# Patient Record
Sex: FEMALE | Race: WHITE | NOT HISPANIC OR LATINO | Employment: FULL TIME | URBAN - METROPOLITAN AREA
[De-identification: names, ages, dates, MRNs, and addresses within clinical notes are randomized per-mention and may not be internally consistent; named-entity substitution may affect disease eponyms.]

---

## 2017-04-27 ENCOUNTER — OFFICE VISIT (OUTPATIENT)
Dept: MEDICAL GROUP | Facility: MEDICAL CENTER | Age: 37
End: 2017-04-27
Payer: COMMERCIAL

## 2017-04-27 VITALS
TEMPERATURE: 99.6 F | WEIGHT: 129.2 LBS | HEART RATE: 80 BPM | RESPIRATION RATE: 16 BRPM | SYSTOLIC BLOOD PRESSURE: 108 MMHG | DIASTOLIC BLOOD PRESSURE: 60 MMHG | BODY MASS INDEX: 20.76 KG/M2 | HEIGHT: 66 IN | OXYGEN SATURATION: 98 %

## 2017-04-27 DIAGNOSIS — Z00.00 WELLNESS EXAMINATION: ICD-10-CM

## 2017-04-27 DIAGNOSIS — Z11.59 NEED FOR HEPATITIS B SCREENING TEST: ICD-10-CM

## 2017-04-27 DIAGNOSIS — Z13.6 SCREENING FOR CARDIOVASCULAR CONDITION: ICD-10-CM

## 2017-04-27 PROCEDURE — 99385 PREV VISIT NEW AGE 18-39: CPT | Performed by: PHYSICIAN ASSISTANT

## 2017-04-27 ASSESSMENT — PATIENT HEALTH QUESTIONNAIRE - PHQ9: CLINICAL INTERPRETATION OF PHQ2 SCORE: 0

## 2017-04-27 NOTE — PROGRESS NOTES
Chief Complaint:     Vanessa Mora is a 37 y.o. female who presents for annual exam with labs. Needs hep b titer before starting [employer] school    Pt has GYN provider: yes  Up to date on pap/pelvic. No prior mammogram.     She  has no past medical history on file.no heart or lung disease. No diabetes or immune disorder  She  has no past surgical history on file.no prior surgeries  No current outpatient prescriptions on file.     No current facility-administered medications for this visit.     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  Eyes: negative for visual blurring, double vision, eye pain, floaters and discharge from eyes  Ears/Nose/Throat: negative for tinnitus, vertigo, oral or dental problem, hoarseness, frequent URI's, sinus trouble, persistent sore throat  Respiratory: negative for persistent cough, hemoptysis, dyspnea, wheezing  Cardiovascular: negative for palpitations, tachycardia, irregular heart beat, chest pain or pressure or peripheral edema.  Breast: Denies breast tenderness, mass,  changes in size or contour, or abnormal cyclic discomfort.  Gastrointestinal: negative for dysphagia or odynophagia, nausea, heartburn or reflux, abdominal pain, hemorrhoids, constipation or diarrhea, black stool or bloody stool  Genitourinary: negative for nocturia, dysuria, frequency, incontinence, abnormal vaginal discharge, dysparunia or abnormal vaginal bleeding  Musculoskeletal: negative for joint swelling and muscle pain/ soreness  Neurologic: negative for new or changing headaches, new weakness tremor  Psychiatric: negative for  "mood or sleep disturbance, anxiety, depression,   Hematologic/Lymphatic/Immunologic: negative for pallor, unusual bruising, swollen glands,  Endocrine: negative for temperature intolerance, polydipsia, polyuria.       PHYSICAL EXAMINATION:  Blood pressure 108/60, pulse 80, temperature 37.6 °C (99.6 °F), resp. rate 16, height 1.676 m (5' 6\"), weight 58.605 kg (129 lb 3.2 oz), last menstrual period 04/01/2017, SpO2 98 %.  Body mass index is 20.86 kg/(m^2).  Wt Readings from Last 4 Encounters:   04/27/17 58.605 kg (129 lb 3.2 oz)       Constitutional: Alert, no distress.  Skin: Warm, dry, good turgor, no rashes or suspicious lesions in visible areas.  Eye: pupils equal, round and reactive to light, conjunctivae clear, lids normal.  ENMT: Lips without lesions, good dentition, oropharynx clear. Pinnae skin normal with no lesions. TM pearly gray with normal light reflex.   Neck: supple, no masses. No anterior or supraclavicular adenopathy. No carotid bruits no thyromegaly.  Respiratory: Unlabored respiratory effort, lungs clear to auscultation, no wheezes, no ronchi.  Cardiovascular: Normal S1, S2, no murmur, no peripheral edema.  Psych: Alert and oriented x3, normal affect and mood.  Musc/Skel: 5/5 strength and normal motion UE and LE proximal and distal.        ASSESSMENT/PLAN:  1. Wellness examination  CBC WITH DIFFERENTIAL    COMP METABOLIC PANEL    TSH WITH REFLEX TO FT4   2. Screening for cardiovascular condition  LIPID PROFILE   3. Need for hepatitis B screening test  HEP B SURFACE AB       Labs ordered, will call with results  Next office visit for recheck of chronic medical conditions is due in  1 year    "

## 2017-04-27 NOTE — MR AVS SNAPSHOT
"        Vanessa Mora   2017 1:40 PM   Office Visit   MRN: [id number]    Department:  [facility]   Dept Phone:  [phone]    Description:  Female : 1980   Provider:  Randa Busby PA-C           Reason for Visit     Establish Care     Contraception     Other titers for hep B, TB test      Allergies as of 2017     No Known Allergies      You were diagnosed with     Wellness examination   [[id number]]       Screening for cardiovascular condition   [[id number]]       Need for hepatitis B screening test   [[id number]]         Vital Signs     Blood Pressure Pulse Temperature Respirations Height Weight    108/60 mmHg 80 37.6 °C (99.6 °F) 16 1.676 m (5' 6\") 58.605 kg (129 lb 3.2 oz)    Body Mass Index Oxygen Saturation Last Menstrual Period Smoking Status          20.86 kg/m2 98% 2017 Never Smoker         Basic Information     Date Of Birth Sex Race Ethnicity Preferred Language    1980 Female White Non- English      Health Maintenance        Date Due Completion Dates    IMM DTaP/Tdap/Td Vaccine (1 - Tdap) 2001 ---    PAP SMEAR 2003 ---            Current Immunizations     No immunizations on file.      Below and/or attached are the medications your provider expects you to take. Review all of your home medications and newly ordered medications with your provider and/or pharmacist. Follow medication instructions as directed by your provider and/or pharmacist. Please keep your medication list with you and share with your provider. Update the information when medications are discontinued, doses are changed, or new medications (including over-the-counter products) are added; and carry medication information at all times in the event of emergency situations     Allergies:  No Known Allergies          Medications  Valid as of: 2017 -  2:05 PM    Generic Name Brand Name Tablet Size Instructions for use    .                 Medicines prescribed today were sent to:    " None      Medication refill instructions:       If your prescription bottle indicates you have medication refills left, it is not necessary to call your provider’s office. Please contact your pharmacy and they will refill your medication.    If your prescription bottle indicates you do not have any refills left, you may request refills at any time through one of the following ways: The online Krikle system (except Urgent Care), by calling your provider’s office, or by asking your pharmacy to contact your provider’s office with a refill request. Medication refills are processed only during regular business hours and may not be available until the next business day. Your provider may request additional information or to have a follow-up visit with you prior to refilling your medication.   *Please Note: Medication refills are assigned a new Rx number when refilled electronically. Your pharmacy may indicate that no refills were authorized even though a new prescription for the same medication is available at the pharmacy. Please request the medicine by name with the pharmacy before contacting your provider for a refill.        Your To Do List     Future Labs/Procedures Complete By Expires    CBC WITH DIFFERENTIAL  As directed 4/27/2018    COMP METABOLIC PANEL  As directed 4/27/2018    HEP B SURFACE AB  As directed 4/27/2018    LIPID PROFILE  As directed 4/27/2018    TSH WITH REFLEX TO FT4  As directed 4/27/2018         Krikle Access Code: [email or web address]  Expires: 5/26/2017 12:24 PM    Krikle  A secure, online tool to manage your health information     [employer]’s Krikle® is a secure, online tool that connects you to your personalized health information from the privacy of your home -- day or night - making it very easy for you to manage your healthcare. Once the activation process is completed, you can even access your medical information using the Krikle andrea, which is available for free in the Apple Andrea  store or Google Play store.     [employer] provides the following levels of access (as shown below):   My Chart Features   [facility] Doctor [facility]  Specialists [facility]  Urgent  Care Non-[facility]  Primary Care  Doctor   Email your healthcare team securely and privately 24/7 X X X    Manage appointments: schedule your next appointment; view details of past/upcoming appointments X      Request prescription refills. X      View recent personal medical records, including lab and immunizations X X X X   View health record, including health history, allergies, medications X X X X   Read reports about your outpatient visits, procedures, consult and ER notes X X X X   See your discharge summary, which is a recap of your hospital and/or ER visit that includes your diagnosis, lab results, and care plan. X X       How to register for [employer]:  1. Go to  [email or web address].  2. Click on the Sign Up Now box, which takes you to the New Member Sign Up page. You will need to provide the following information:  a. Enter your [employer] Access Code exactly as it appears at the top of this page. (You will not need to use this code after you’ve completed the sign-up process. If you do not sign up before the expiration date, you must request a new code.)   b. Enter your date of birth.   c. Enter your home email address.   d. Click Submit, and follow the next screen’s instructions.  3. Create a [employer] ID. This will be your [employer] login ID and cannot be changed, so think of one that is secure and easy to remember.  4. Create a [employer] password. You can change your password at any time.  5. Enter your Password Reset Question and Answer. This can be used at a later time if you forget your password.   6. Enter your e-mail address. This allows you to receive e-mail notifications when new information is available in [employer].  7. Click Sign Up. You can now view your health information.    For assistance activating your [employer]  account, call [phone]

## 2017-07-14 ENCOUNTER — OFFICE VISIT (OUTPATIENT)
Dept: MEDICAL GROUP | Facility: MEDICAL CENTER | Age: 37
End: 2017-07-14
Payer: COMMERCIAL

## 2017-07-14 VITALS
SYSTOLIC BLOOD PRESSURE: 98 MMHG | TEMPERATURE: 98.9 F | RESPIRATION RATE: 16 BRPM | DIASTOLIC BLOOD PRESSURE: 52 MMHG | HEIGHT: 66 IN | WEIGHT: 127.4 LBS | OXYGEN SATURATION: 98 % | HEART RATE: 70 BPM | BODY MASS INDEX: 20.48 KG/M2

## 2017-07-14 DIAGNOSIS — R19.7 DIARRHEA, UNSPECIFIED TYPE: ICD-10-CM

## 2017-07-14 DIAGNOSIS — R11.0 NAUSEA: ICD-10-CM

## 2017-07-14 DIAGNOSIS — R10.84 GENERALIZED ABDOMINAL PAIN: ICD-10-CM

## 2017-07-14 LAB
INT CON NEG: NEGATIVE
INT CON POS: POSITIVE
POC URINE PREGNANCY TEST: NORMAL

## 2017-07-14 PROCEDURE — 99214 OFFICE O/P EST MOD 30 MIN: CPT | Performed by: PHYSICIAN ASSISTANT

## 2017-07-14 PROCEDURE — 81025 URINE PREGNANCY TEST: CPT | Performed by: PHYSICIAN ASSISTANT

## 2017-07-14 NOTE — ASSESSMENT & PLAN NOTE
Starting in the winter had episodic bad diarrhea. More frequent since May. Recently very nauseated to the point of vomiting.usually there is some abdominal pain associated either epigastric or cramping generalized.No travel out of the country. Changing diet. Probiotics didn't help. Did have a lot of stress in the spring but now not stressed and still having the problem. Occasional pre arelis vitamins. Did try probiotics.     Normal day might have loose stool but not the bad cramping. Will avoid eating it she has to go out. If she does eat has diarrhea with bad cramping within 2 hours. Feels she is eating more and more, not gaining weight. Has stopped drinking any alcohol. Even without the cramping and diarrhea there is a low level discomfort, nausea.    When she is eating doesn't really hurt or feel better.

## 2017-07-14 NOTE — MR AVS SNAPSHOT
"        Vanessa Mora   2017 8:40 AM   Office Visit   MRN: [id number]    Department:  [facility]   Dept Phone:  [phone]    Description:  Female : 1980   Provider:  Randa Busby PA-C           Reason for Visit     GI Problem stomach pain, on/off runny stool x month, nausea/vomiting constant x april      Allergies as of 2017     No Known Allergies      You were diagnosed with     Generalized abdominal pain   [[id number]]       Nausea   [552059]       Diarrhea, unspecified type   [[id number]]         Vital Signs     Blood Pressure Pulse Temperature Respirations Height Weight    98/52 mmHg 70 37.2 °C (98.9 °F) 16 1.676 m (5' 6\") 57.788 kg (127 lb 6.4 oz)    Body Mass Index Oxygen Saturation Last Menstrual Period Smoking Status          20.57 kg/m2 98% 2017 Never Smoker         Basic Information     Date Of Birth Sex Race Ethnicity Preferred Language    1980 Female White Non- English      Your appointments     Oct 06, 2017  9:00 AM   New Patient with Leticia Sin M.D.   [facility]    [address]  [facility] [address] [phone]              Problem List              ICD-10-CM Priority Class Noted - Resolved    Generalized abdominal pain R10.84   2017 - Present      Health Maintenance        Date Due Completion Dates    IMM DTaP/Tdap/Td Vaccine (1 - Tdap) 2001 ---    PAP SMEAR 2003 ---    IMM INFLUENZA (1) 2017 ---            Results     POCT Pregnancy      Component Value Standard Range & Units    POC Urine Pregnancy Test Neg Negative    Internal Control Positive Positive     Internal Control Negative Negative                         Current Immunizations     No immunizations on file.      Below and/or attached are the medications your provider expects you to take. Review all of your home medications and newly ordered medications with your provider and/or pharmacist. Follow " medication instructions as directed by your provider and/or pharmacist. Please keep your medication list with you and share with your provider. Update the information when medications are discontinued, doses are changed, or new medications (including over-the-counter products) are added; and carry medication information at all times in the event of emergency situations     Allergies:  No Known Allergies          Medications  Valid as of: July 14, 2017 -  9:30 AM    Generic Name Brand Name Tablet Size Instructions for use    .                 Medicines prescribed today were sent to:     None      Medication refill instructions:       If your prescription bottle indicates you have medication refills left, it is not necessary to call your provider’s office. Please contact your pharmacy and they will refill your medication.    If your prescription bottle indicates you do not have any refills left, you may request refills at any time through one of the following ways: The online Cliq system (except Urgent Care), by calling your provider’s office, or by asking your pharmacy to contact your provider’s office with a refill request. Medication refills are processed only during regular business hours and may not be available until the next business day. Your provider may request additional information or to have a follow-up visit with you prior to refilling your medication.   *Please Note: Medication refills are assigned a new Rx number when refilled electronically. Your pharmacy may indicate that no refills were authorized even though a new prescription for the same medication is available at the pharmacy. Please request the medicine by name with the pharmacy before contacting your provider for a refill.        Your To Do List     Future Labs/Procedures Complete By Ingris    CDIFF BY PCR  As directed 7/15/2017    CRYPTO/GIARDIA RAPID ASSAY  As directed 7/14/2018    CULTURE STOOL  As directed 7/14/2018    H.PYLORI STOOL  ANTIGEN  As directed 7/14/2018         [employer] Access Code: Activation code not generated  Current [employer] Status: Active

## 2017-07-20 NOTE — PROGRESS NOTES
"Subjective:   Vanessa Mora is a 37 y.o. female here today for discussion of chronic abdominal pain and diarrhea that started months ago    Generalized abdominal pain  Starting in the winter had episodic bad diarrhea. More frequent since May. Recently very nauseated to the point of vomiting.usually there is some abdominal pain associated either epigastric or cramping generalized.No travel out of the country. Changing diet. Probiotics didn't help. Did have a lot of stress in the spring but now not stressed and still having the problem. Occasional pre arelis vitamins. Did try probiotics.     Normal day might have loose stool but not the bad cramping. Will avoid eating it she has to go out. If she does eat has diarrhea with bad cramping within 2 hours. Feels she is eating more and more, not gaining weight. Has stopped drinking any alcohol. Even without the cramping and diarrhea there is a low level discomfort, nausea.    When she is eating doesn't really hurt or feel better.     No travel, no antibiotics, non known contaminated foods    Current medicines (including changes today)  No current outpatient prescriptions on file.     No current facility-administered medications for this visit.     She  has no past medical history on file.    ROS   No fever/chills. No weight change. No headache/dizziness. No focal weakness. No sore throat, nasal congestion, ear pain. No chest pain, no shortness of breath, difficulty breathing.  No urinary complaint. No rash or skin lesion. No joint pain or swelling.       Objective:     Blood pressure 98/52, pulse 70, temperature 37.2 °C (98.9 °F), resp. rate 16, height 1.676 m (5' 6\"), weight 57.788 kg (127 lb 6.4 oz), last menstrual period 2017, SpO2 98 %. Body mass index is 20.57 kg/(m^2).   Physical Exam:  Constitutional: WDWN, NAD  Skin: Warm, dry, good turgor, no rashes in visible areas.  Eye: Equal, round and reactive, conjunctiva clear, lids normal.  ENMT: Lips without " lesions, good dentition, oropharynx clear.  Respiratory: Unlabored respiratory effort, lungs clear to auscultation, no wheezes, no ronchi.  Cardiovascular: Normal S1, S2, no murmur, no leg edema.  Abdomen: Soft, non-tender, no masses, no hepatosplenomegaly.  Psych: Alert and oriented x3, normal affect and mood.    POCT pregnancy negative    Assessment and Plan:   The following treatment plan was discussed    1. Generalized abdominal pain    - CULTURE STOOL; Future  - H.PYLORI STOOL ANTIGEN; Future  - CRYPTO/GIARDIA RAPID ASSAY; Future  - CDIFF BY PCR; Future    2. Nausea    - POCT Pregnancy  - CULTURE STOOL; Future  - H.PYLORI STOOL ANTIGEN; Future  - CRYPTO/GIARDIA RAPID ASSAY; Future  - CDIFF BY PCR; Future    3. Diarrhea, unspecified type    - CULTURE STOOL; Future  - H.PYLORI STOOL ANTIGEN; Future  - CRYPTO/GIARDIA RAPID ASSAY; Future  - CDIFF BY PCR; Future      Followup: Return for after labs.  Consider functional cause for symptoms like IBS.

## 2017-11-19 ENCOUNTER — OFFICE VISIT (OUTPATIENT)
Dept: URGENT CARE | Facility: CLINIC | Age: 37
End: 2017-11-19
Payer: COMMERCIAL

## 2017-11-19 VITALS
TEMPERATURE: 98 F | RESPIRATION RATE: 12 BRPM | DIASTOLIC BLOOD PRESSURE: 58 MMHG | WEIGHT: 131 LBS | OXYGEN SATURATION: 99 % | HEART RATE: 60 BPM | SYSTOLIC BLOOD PRESSURE: 100 MMHG | HEIGHT: 67 IN | BODY MASS INDEX: 20.56 KG/M2

## 2017-11-19 DIAGNOSIS — L30.9 DERMATITIS: ICD-10-CM

## 2017-11-19 PROCEDURE — 99214 OFFICE O/P EST MOD 30 MIN: CPT | Performed by: PHYSICIAN ASSISTANT

## 2017-11-19 RX ORDER — CEPHALEXIN 500 MG/1
500 CAPSULE ORAL 4 TIMES DAILY
Qty: 28 CAP | Refills: 0 | Status: SHIPPED | OUTPATIENT
Start: 2017-11-19 | End: 2017-11-26

## 2017-11-19 ASSESSMENT — ENCOUNTER SYMPTOMS
DIZZINESS: 0
FATIGUE: 0
HEADACHES: 0
SHORTNESS OF BREATH: 0
EYE DISCHARGE: 0
ABDOMINAL PAIN: 0
MYALGIAS: 0
NECK PAIN: 0
EYE REDNESS: 0
RHINORRHEA: 0
BACK PAIN: 0
SORE THROAT: 0
FEVER: 0
DIARRHEA: 0
COUGH: 0
CHILLS: 0
VOMITING: 0

## 2017-11-19 NOTE — PROGRESS NOTES
"Subjective:      Vanessa Mora is a 37 y.o. female who presents with Rash (x1week, rash on left side of face, painful)            Pt is 38 y/o female who presents with rash to her face, along her hair line, and her scalp for the last 6-7 days. She reports that the only new product was a new lotion, however she used this other areas and she doesn't have a rash there. She denies any itching, only pain- like to her face- more like a soreness due to acne. She denies any fevers, or HA. She does mention that 7 days ago she had her hair washed, colored, and cut- however she has not had a reaction like this before. She denies any improvement with Benadryl. Denies any sore throat, or SOB.   Of note patient has not been in any new water sources.       Rash   This is a new problem. The current episode started in the past 7 days. The problem has been gradually worsening since onset. The affected locations include the scalp, head and face. The rash is characterized by redness. Associated with: new lotion. Pertinent negatives include no congestion, cough, diarrhea, fatigue, fever, joint pain, rhinorrhea, shortness of breath, sore throat or vomiting. Past treatments include antihistamine. The treatment provided no relief. There is no history of allergies or asthma.       Review of Systems   Constitutional: Negative for chills, fatigue and fever.   HENT: Negative for congestion, rhinorrhea and sore throat.    Eyes: Negative for discharge and redness.   Respiratory: Negative for cough and shortness of breath.    Gastrointestinal: Negative for abdominal pain, diarrhea and vomiting.   Musculoskeletal: Negative for back pain, joint pain, myalgias and neck pain.   Skin: Positive for rash. Negative for itching.   Neurological: Negative for dizziness and headaches.   All other systems are reviewed and are negative.          Objective:     /58   Pulse 60   Temp 36.7 °C (98 °F)   Resp 12   Ht 1.702 m (5' 7\")   Wt 59.4 kg (131 " lb)   SpO2 99%   BMI 20.52 kg/m²    PMH:  has no past medical history on file.  MEDS:   Current Outpatient Prescriptions:   •  cephALEXin (KEFLEX) 500 MG Cap, Take 1 Cap by mouth 4 times a day for 7 days., Disp: 28 Cap, Rfl: 0  ALLERGIES: No Known Allergies  SURGHX: No past surgical history on file.  SOCHX:  reports that she has never smoked. She has never used smokeless tobacco. She reports that she drinks about 3.0 oz of alcohol per week . She reports that she does not use drugs.  FH: Family history was reviewed, no pertinent findings to report    Physical Exam   Constitutional: She is oriented to person, place, and time. She appears well-developed and well-nourished.   HENT:   Head: Atraumatic.   Left Ear: External ear normal.   Nose: Nose normal.   Mouth/Throat: Oropharynx is clear and moist.   Eyes: EOM are normal. Pupils are equal, round, and reactive to light.   Neck: Normal range of motion. Neck supple.   Cardiovascular: Normal rate.    Pulmonary/Chest: No respiratory distress.   Lymphadenopathy:     She has no cervical adenopathy.   Neurological: She is alert and oriented to person, place, and time.   Skin: Skin is warm. Capillary refill takes less than 2 seconds. Rash noted.        Scattered erythematous papular/pustular rash to scalp line, forehead, upper chest and upper back. Without significant edema, or increased warmth. Without any evidence of infestation.    Psychiatric: She has a normal mood and affect. Her behavior is normal. Thought content normal.   Vitals reviewed.              Assessment/Plan:     1. Dermatitis  - cephALEXin (KEFLEX) 500 MG Cap; Take 1 Cap by mouth 4 times a day for 7 days.  Dispense: 28 Cap; Refill: 0    At this time it appears less than an allergic dermatitis- and might of started off as an irritant derm- and now the rash is papular/pustular in nature- following the hairline- will treat with ABX therapy at this time due to nature of the lesions.   Looks even similar to  folliculitis at this time.   Encouraged the avoidance of hot showers, topical abx ointment.   Ice if very irritated. Do not squeeze or puncture the pustules.   Patient given precautionary s/sx that mandate immediate follow up and evaluation in the ED. Advised of risks of not doing so.    DDX, Supportive care, and indications for immediate follow-up discussed with patient.    Instructed to return to clinic or nearest emergency department if we are not available for any change in condition, further concerns, or worsening of symptoms.    The patient demonstrated a good understanding and agreed with the treatment plan.

## 2017-11-24 ENCOUNTER — OFFICE VISIT (OUTPATIENT)
Dept: URGENT CARE | Facility: CLINIC | Age: 37
End: 2017-11-24
Payer: COMMERCIAL

## 2017-11-24 VITALS
OXYGEN SATURATION: 100 % | DIASTOLIC BLOOD PRESSURE: 60 MMHG | HEIGHT: 67 IN | HEART RATE: 55 BPM | BODY MASS INDEX: 20.25 KG/M2 | WEIGHT: 129 LBS | TEMPERATURE: 97.8 F | SYSTOLIC BLOOD PRESSURE: 106 MMHG

## 2017-11-24 DIAGNOSIS — R21 RASH AND NONSPECIFIC SKIN ERUPTION: ICD-10-CM

## 2017-11-24 PROCEDURE — 99214 OFFICE O/P EST MOD 30 MIN: CPT | Performed by: PHYSICIAN ASSISTANT

## 2017-11-24 RX ORDER — PREDNISONE 20 MG/1
TABLET ORAL
Qty: 7 TAB | Refills: 0 | Status: SHIPPED | OUTPATIENT
Start: 2017-11-24 | End: 2019-05-01

## 2017-11-24 ASSESSMENT — ENCOUNTER SYMPTOMS
TINGLING: 0
VOMITING: 0
FEVER: 0
CHILLS: 0
SHORTNESS OF BREATH: 0
NAUSEA: 0
BRUISES/BLEEDS EASILY: 0

## 2017-11-24 NOTE — PROGRESS NOTES
"Subjective:      Vanessa Mora is a 37 y.o. female who presents with Rash (\"on face not getting better,antibiotic prescribed last week did not help\")      Rash   Pertinent negatives include no fever, shortness of breath or vomiting.   notes thought rash had come from 'hair place, has spread down to neck/chest, had coloring done to hair, thought to be allergic, thought possible asosciated w/ bacterial infection, started on abx, \"I want to say it's a little better\" - but not confident - feels slightly better but not sure, not spreading very quickly anymore, tolerating abx, has today/tomorrow w/ abx yet.     Some mild soreness associated, denies itch, c/o slight soreness, denies much pain, bilat. PMH of nothing similar. Tried abx oint. Denies     Review of Systems   Constitutional: Negative for chills and fever.   Respiratory: Negative for shortness of breath.    Gastrointestinal: Negative for nausea and vomiting.   Skin: Positive for rash. Negative for itching.        Pos for facial rash     Neurological: Negative for tingling.   Endo/Heme/Allergies: Negative for environmental allergies. Does not bruise/bleed easily.     PMH:  has no past medical history on file.  MEDS:   Current Outpatient Prescriptions:   •  cephALEXin (KEFLEX) 500 MG Cap, Take 1 Cap by mouth 4 times a day for 7 days., Disp: 28 Cap, Rfl: 0  ALLERGIES: No Known Allergies  SURGHX: No past surgical history on file.  SOCHX:  reports that she has never smoked. She has never used smokeless tobacco. She reports that she drinks about 3.0 oz of alcohol per week . She reports that she does not use drugs.  FH: Family history was reviewed, no pertinent findings to report       Objective:     /60   Pulse (!) 55   Temp 36.6 °C (97.8 °F)   Ht 1.702 m (5' 7\")   Wt 58.5 kg (129 lb)   SpO2 100%   BMI 20.20 kg/m²      Physical Exam   Constitutional: She is oriented to person, place, and time. She appears well-developed and well-nourished. No distress. "   HENT:   Head: Normocephalic and atraumatic.   Right Ear: External ear normal.   Left Ear: External ear normal.   Nose: Nose normal.   Eyes: Conjunctivae and lids are normal. Right eye exhibits no discharge. Left eye exhibits no discharge. No scleral icterus.   Neck: Neck supple.   Pulmonary/Chest: Effort normal. No respiratory distress.   Musculoskeletal: Normal range of motion.   Neurological: She is alert and oriented to person, place, and time. She is not disoriented.   Skin: Skin is warm, dry and intact. Rash noted. No purpura noted. Rash is papular. Rash is not nodular, not pustular, not vesicular and not urticarial. She is not diaphoretic. There is erythema. No pallor.        Slightly raised erythematous blanching rash to hairline cheeks, neck and chest, papular rash with apparent excoriation despite patient denying much itch associated, nonvesicular, nonulcerative nonnodular, non-urticarial rash   Psychiatric: Her speech is normal and behavior is normal.   Nursing note and vitals reviewed.       Assessment/Plan:     1. Rash and nonspecific skin eruption  Unclear etiology of rash, w/ little change w/ abx, will trial steroid today, finish keflex, Cautioned regarding potential side effects of steroid, avoid nsaids while using  Referral to derm placed, rash appears like folliculitis or acne - no change w/ abx - no itch, onset after hair coloring  Return to clinic with lack of resolution or progression of symptoms.    - predniSONE (DELTASONE) 20 MG Tab; Take one tab PO daily x 7d  Dispense: 7 Tab; Refill: 0  - REFERRAL TO DERMATOLOGY

## 2017-12-12 ENCOUNTER — TELEPHONE (OUTPATIENT)
Dept: OBGYN | Facility: MEDICAL CENTER | Age: 37
End: 2017-12-12

## 2017-12-12 NOTE — TELEPHONE ENCOUNTER
----- Message from Magui Mccann sent at 12/12/2017  9:00 AM PST -----  Regarding: questions  Contact: [phone]  Patient called and she started her period yesterday.wanted to know if she will be ok to come in for her appt for 12/15/17 with  for an annual please call back thank you.

## 2017-12-13 NOTE — TELEPHONE ENCOUNTER
Pt called back and was informed to keep her appointment for 12/15/17 since it will be very light at that time

## 2017-12-15 ENCOUNTER — GYNECOLOGY VISIT (OUTPATIENT)
Dept: OBGYN | Facility: CLINIC | Age: 37
End: 2017-12-15
Payer: COMMERCIAL

## 2017-12-15 VITALS
WEIGHT: 129 LBS | SYSTOLIC BLOOD PRESSURE: 120 MMHG | DIASTOLIC BLOOD PRESSURE: 82 MMHG | HEIGHT: 67 IN | BODY MASS INDEX: 20.25 KG/M2

## 2017-12-15 DIAGNOSIS — Z11.51 ENCOUNTER FOR SCREENING FOR HUMAN PAPILLOMAVIRUS (HPV): ICD-10-CM

## 2017-12-15 DIAGNOSIS — Z01.419 WELL WOMAN EXAM: ICD-10-CM

## 2017-12-15 DIAGNOSIS — Z12.4 ROUTINE CERVICAL SMEAR: ICD-10-CM

## 2017-12-15 PROCEDURE — 99385 PREV VISIT NEW AGE 18-39: CPT | Performed by: OBSTETRICS & GYNECOLOGY

## 2017-12-15 NOTE — PROGRESS NOTES
" Vanessa Peng y.o.  female presents for Annual Well Woman Exam.   Patient is currently without complaints. Patient is   Having normal menses with last menstrual period 3 days ago.  Normal menstrual cycles no breakthrough bleeding or spotting. Patient is not attempting to conceive but not preventing pregnancy either    ROS: Patient is feeling well. No dyspnea or chest pain on exertion. No Abdominal pain, change in bowel habits, black or bloody stools. No urinary sx. GYN ROS:normal menses, no abnormal bleeding, pelvic pain or discharge, no breast pain or new or enlarging lumps on self exam. Denies breast tenderness, mass, discharge, changes in size or contour, or abnormal cyclic discomfort. No neurological complaints.  History reviewed. No pertinent past medical history.  None  Allergy:   Patient has no known allergies.    LMP:       Patient's last menstrual period was 2017. .     Menstrual History: menses regular every 30 days      Pap history, the patient denies abnormal Pap smears and denies   sexually transmitted diseases    Mammo:    Objective : The patient appears well, alert and oriented x 3, in no acute distress.  Blood pressure 120/82, height 1.702 m (5' 7\"), weight 58.5 kg (129 lb), last menstrual period 2017, not currently breastfeeding.  HEENT Exam: EOMI, FARNAZ, no adenopathy or thyromegaly.  Lungs: Clear to Auscultation   Cor: S1 and S2 normal, no murmurs, or rubs   Abdomen: Soft without tenderness, guarding mass or organomegaly.  Extremities: No edema, pulses equal  Neurological: Normal No focal signs  Breast Exam:Inspection negative. No nipple discharge or bleeding. No masses or nodularity palpable  Pelvic: negative findings: external genitalia normal, [place]th[place]'s glands, urethra, [place]'s glands negative, vaginal mucosa normal, cervix clear, normal sized uterus, adnexae negative    Lab:No results found for this or any previous visit (from the past 336 hour(s)).    Assessment:  " well woman    Plan:pap smear  return annually or prn  Recommend prenatal vitamin  Contraception:none

## 2018-04-04 ENCOUNTER — HOSPITAL ENCOUNTER (OUTPATIENT)
Facility: MEDICAL CENTER | Age: 38
End: 2018-04-04
Attending: PREVENTIVE MEDICINE
Payer: COMMERCIAL

## 2018-04-04 ENCOUNTER — EH NON-PROVIDER (OUTPATIENT)
Dept: OCCUPATIONAL MEDICINE | Facility: CLINIC | Age: 38
End: 2018-04-04

## 2018-04-04 ENCOUNTER — EMPLOYEE HEALTH (OUTPATIENT)
Dept: OCCUPATIONAL MEDICINE | Facility: CLINIC | Age: 38
End: 2018-04-04

## 2018-04-04 VITALS
DIASTOLIC BLOOD PRESSURE: 62 MMHG | OXYGEN SATURATION: 96 % | WEIGHT: 129 LBS | HEIGHT: 66 IN | HEART RATE: 76 BPM | SYSTOLIC BLOOD PRESSURE: 100 MMHG | BODY MASS INDEX: 20.73 KG/M2

## 2018-04-04 DIAGNOSIS — Z02.1 PRE-EMPLOYMENT HEALTH SCREENING EXAMINATION: ICD-10-CM

## 2018-04-04 DIAGNOSIS — Z02.1 PRE-EMPLOYMENT DRUG SCREENING: ICD-10-CM

## 2018-04-04 LAB
AMP AMPHETAMINE: NORMAL
BAR BARBITURATES: NORMAL
BZO BENZODIAZEPINES: NORMAL
COC COCAINE: NORMAL
INT CON NEG: NORMAL
INT CON POS: NORMAL
MDMA ECSTASY: NORMAL
MET METHAMPHETAMINES: NORMAL
MTD METHADONE: NORMAL
OPI OPIATES: NORMAL
OXY OXYCODONE: NORMAL
PCP PHENCYCLIDINE: NORMAL
POC URINE DRUG SCREEN OCDRS: NORMAL
THC: NORMAL

## 2018-04-04 PROCEDURE — 8915 PR COMPREHENSIVE PHYSICAL: Performed by: PREVENTIVE MEDICINE

## 2018-04-04 PROCEDURE — 80305 DRUG TEST PRSMV DIR OPT OBS: CPT | Performed by: PREVENTIVE MEDICINE

## 2018-04-04 PROCEDURE — 86480 TB TEST CELL IMMUN MEASURE: CPT | Performed by: PREVENTIVE MEDICINE

## 2018-04-06 ENCOUNTER — DOCUMENTATION (OUTPATIENT)
Dept: OCCUPATIONAL MEDICINE | Facility: CLINIC | Age: 38
End: 2018-04-06

## 2018-04-06 LAB
M TB TUBERC IFN-G BLD QL: NEGATIVE
M TB TUBERC IFN-G/MITOGEN IGNF BLD: -0.01
M TB TUBERC IGNF/MITOGEN IGNF CONTROL: 44.32 [IU]/ML
MITOGEN IGNF BCKGRD COR BLD-ACNC: 0.03 [IU]/ML

## 2018-07-09 ENCOUNTER — TELEPHONE (OUTPATIENT)
Dept: MEDICAL GROUP | Facility: MEDICAL CENTER | Age: 38
End: 2018-07-09

## 2018-07-09 DIAGNOSIS — H04.129 DRY EYE: ICD-10-CM

## 2018-07-09 NOTE — TELEPHONE ENCOUNTER
1. Caller Name: Vanessa Mora       Call Back Number: [phone] (home)         Patient approves a detailed voicemail message: N\A    Patient called to establish care as she was assigned to Dr. Valorie Martinez, however, she would like to be seen sooner than November. Willing to see another provider. Needs a referral after seeing her eye doctor.

## 2018-07-09 NOTE — PROGRESS NOTES
1. Caller Name: Vanessa Mora                                           Call Back Number: [phone] (home)         Patient approves a detailed voicemail message: N\A    2. SPECIFIC Action To Be Taken: Referral pending, please sign.    3. Diagnosis/Clinical Reason for Request: Dry Eye    4. Specialty & Provider Name/Lab/Imaging Location: Dry Eye clinic    5. Is appointment scheduled for requested order/referral: no    Patient was informed they will receive a return phone call from the office ONLY if there are any questions before processing their request. Advised to call back if they haven't received a call from the referral department in 5 days.

## 2018-07-26 DIAGNOSIS — Z01.84 IMMUNITY TO VARICELLA DETERMINED BY SEROLOGIC TEST: ICD-10-CM

## 2018-07-26 NOTE — PROGRESS NOTES
1. Caller Name: Vanessa Mora                                           Call Back Number: [phone] (home)         Patient approves a detailed voicemail message: N\A    2. SPECIFIC Action To Be Taken: Orders pending, please sign.    3. Diagnosis/Clinical Reason for Request: Need for varicella titers for school    4. Specialty & Provider Name/Lab/Imaging Location: [facility]    5. Is appointment scheduled for requested order/referral: no    Patient was not informed they will receive a return phone call from the office ONLY if there are any questions before processing their request. Advised to call back if they haven't received a call from the referral department in 5 days.

## 2018-07-30 ENCOUNTER — HOSPITAL ENCOUNTER (OUTPATIENT)
Dept: LAB | Facility: MEDICAL CENTER | Age: 38
End: 2018-07-30
Attending: PHYSICIAN ASSISTANT
Payer: COMMERCIAL

## 2018-07-30 DIAGNOSIS — Z01.84 IMMUNITY TO VARICELLA DETERMINED BY SEROLOGIC TEST: ICD-10-CM

## 2018-07-30 PROCEDURE — 36415 COLL VENOUS BLD VENIPUNCTURE: CPT

## 2018-07-30 PROCEDURE — 86787 VARICELLA-ZOSTER ANTIBODY: CPT | Mod: 91

## 2018-08-01 LAB
VZV IGG SER IA-ACNC: 483 IV
VZV IGM SER IA-ACNC: 0.01 ISR

## 2018-08-03 ENCOUNTER — TELEPHONE (OUTPATIENT)
Dept: MEDICAL GROUP | Facility: MEDICAL CENTER | Age: 38
End: 2018-08-03

## 2018-08-06 ENCOUNTER — TELEPHONE (OUTPATIENT)
Dept: MEDICAL GROUP | Facility: MEDICAL CENTER | Age: 38
End: 2018-08-06

## 2018-08-06 NOTE — TELEPHONE ENCOUNTER
----- Message from Randa Busby P.A.-C. sent at 8/2/2018  7:21 PM PDT -----  Please verify patient receives results. Thanks! Randa Busby PA-C

## 2018-12-13 ENCOUNTER — EH NON-PROVIDER (OUTPATIENT)
Dept: OCCUPATIONAL MEDICINE | Facility: CLINIC | Age: 38
End: 2018-12-13

## 2018-12-13 DIAGNOSIS — Z02.89 ENCOUNTER FOR OCCUPATIONAL HEALTH EXAMINATION INVOLVING RESPIRATOR: ICD-10-CM

## 2018-12-13 PROCEDURE — 94375 RESPIRATORY FLOW VOLUME LOOP: CPT | Performed by: FAMILY MEDICINE

## 2018-12-14 ENCOUNTER — EH NON-PROVIDER (OUTPATIENT)
Dept: OCCUPATIONAL MEDICINE | Facility: CLINIC | Age: 38
End: 2018-12-14

## 2018-12-14 DIAGNOSIS — Z01.89 RESPIRATORY CLEARANCE EXAMINATION, ENCOUNTER FOR: ICD-10-CM

## 2019-03-26 ENCOUNTER — NON-PROVIDER VISIT (OUTPATIENT)
Dept: URGENT CARE | Facility: CLINIC | Age: 39
End: 2019-03-26

## 2019-03-26 DIAGNOSIS — Z11.1 PPD SCREENING TEST: ICD-10-CM

## 2019-03-26 PROCEDURE — 86580 TB INTRADERMAL TEST: CPT | Performed by: FAMILY MEDICINE

## 2019-03-26 NOTE — PROGRESS NOTES
Vanessa Mora is a 38 y.o. female here for a non-provider visit for PPD placement -- Step 1 of 1    Reason for PPD:  school requirement    1. TB evaluation questionnaire completed by patient? Yes      -  If any answers marked yes did you contact a provider prior to placing? Not Indicated  2.  Patient notified to return to clinic for reading on: 03/28/2019  3.  PPD Placement documentation completed on TB evaluation questionnaire? Yes  4.  Location of TB evaluation questionnaire filed: back office

## 2019-03-28 ENCOUNTER — NON-PROVIDER VISIT (OUTPATIENT)
Dept: URGENT CARE | Facility: CLINIC | Age: 39
End: 2019-03-28

## 2019-03-28 LAB — TB WHEAL 3D P 5 TU DIAM: 0 MM

## 2019-05-01 ENCOUNTER — OFFICE VISIT (OUTPATIENT)
Dept: MEDICAL GROUP | Facility: MEDICAL CENTER | Age: 39
End: 2019-05-01
Payer: COMMERCIAL

## 2019-05-01 VITALS
RESPIRATION RATE: 14 BRPM | SYSTOLIC BLOOD PRESSURE: 92 MMHG | WEIGHT: 125 LBS | TEMPERATURE: 99.6 F | HEART RATE: 73 BPM | BODY MASS INDEX: 20.09 KG/M2 | DIASTOLIC BLOOD PRESSURE: 66 MMHG | OXYGEN SATURATION: 99 % | HEIGHT: 66 IN

## 2019-05-01 DIAGNOSIS — M25.551 CHRONIC PAIN OF RIGHT HIP: ICD-10-CM

## 2019-05-01 DIAGNOSIS — H04.123 CHRONIC DRYNESS OF BOTH EYES: ICD-10-CM

## 2019-05-01 DIAGNOSIS — R53.83 FATIGUE, UNSPECIFIED TYPE: ICD-10-CM

## 2019-05-01 DIAGNOSIS — G89.29 CHRONIC PAIN OF RIGHT HIP: ICD-10-CM

## 2019-05-01 DIAGNOSIS — M25.511 CHRONIC PAIN IN RIGHT SHOULDER: ICD-10-CM

## 2019-05-01 DIAGNOSIS — L70.9 ACNE, UNSPECIFIED ACNE TYPE: ICD-10-CM

## 2019-05-01 DIAGNOSIS — N76.0 RECURRENT VAGINITIS: ICD-10-CM

## 2019-05-01 DIAGNOSIS — Z00.00 WELLNESS EXAMINATION: ICD-10-CM

## 2019-05-01 DIAGNOSIS — G89.29 CHRONIC PAIN IN RIGHT SHOULDER: ICD-10-CM

## 2019-05-01 DIAGNOSIS — L71.9 ROSACEA: ICD-10-CM

## 2019-05-01 DIAGNOSIS — G89.29 CHRONIC BILATERAL LOW BACK PAIN WITHOUT SCIATICA: ICD-10-CM

## 2019-05-01 DIAGNOSIS — M54.50 CHRONIC BILATERAL LOW BACK PAIN WITHOUT SCIATICA: ICD-10-CM

## 2019-05-01 PROBLEM — R10.84 GENERALIZED ABDOMINAL PAIN: Status: RESOLVED | Noted: 2017-07-14 | Resolved: 2019-05-01

## 2019-05-01 PROCEDURE — 99395 PREV VISIT EST AGE 18-39: CPT | Performed by: PHYSICIAN ASSISTANT

## 2019-05-01 RX ORDER — DOXYCYCLINE HYCLATE 50 MG/1
TABLET, FILM COATED ORAL
COMMUNITY
End: 2020-06-29

## 2019-05-01 RX ORDER — FLUCONAZOLE 150 MG/1
150 TABLET ORAL DAILY
Qty: 10 TAB | Refills: 0 | Status: SHIPPED | OUTPATIENT
Start: 2019-05-01 | End: 2019-05-11

## 2019-05-01 ASSESSMENT — PATIENT HEALTH QUESTIONNAIRE - PHQ9: CLINICAL INTERPRETATION OF PHQ2 SCORE: 0

## 2019-05-01 NOTE — ASSESSMENT & PLAN NOTE
Chronic, sees derm, on doxycycline, would like to think about other options, doesn't want to be on daily doxycycline indefinitely

## 2019-05-01 NOTE — PROGRESS NOTES
Subjective:   Vanessa Mora is a 39 y.o. female here today for annual wellness exam. Pap/pelvic with Dr. Oconnell 2017 with normal results. Up to date on vaccines. Non-smoker. .     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 rec otc drops but if that didn't help to go to a dry eye clinic. Patient would like to do that now.    Rosacea  Chronic, sees derm, on doxycycline, would like to think about other options, doesn't want to be on daily doxycycline indefinitely    Fatigue  Worse over 3 months. Busy with work and school. Doesn't feel depressed. Eats mostly vegan diet. Takes multivitamin sometimes. No easy bruising or bleeding. No weight change. No fever/chills or night sweats.    Recurrent vaginitis  Recurrent yeast infections d/t daily doxycycline. Uses otc monistat but would like to have a rx for diflucan to use prn       Current medicines (including changes today)  Current Outpatient Prescriptions   Medication Sig Dispense Refill   • Doxycycline Hyclate 50 MG Tab Take  by mouth.     • fluconazole (DIFLUCAN) 150 MG tablet Take 1 Tab by mouth every day for 10 days. 10 Tab 0     No current facility-administered medications for this visit.      She  has no past medical history on file.    ROS   No fever/chills. No weight change. No headache/dizziness. No focal weakness. No sore throat, nasal congestion, ear pain. No chest pain, no shortness of breath, difficulty breathing. No n/v/d/c or abdominal pain. No urinary complaint. No rash or skin lesion. No joint redness or swelling.     Objective:     BP (!) 92/66 (BP Location: Right arm, Patient Position: Sitting, BP Cuff Size: Adult)   Pulse 73   Temp 37.6 °C (99.6 °F) (Temporal)   Resp 14   Ht  "1.676 m (5' 6\")   Wt 56.7 kg (125 lb)   SpO2 99%  Body mass index is 20.18 kg/m².   Physical Exam:  Constitutional: WDWN, NAD  Skin: Warm, dry, good turgor, no rashes in visible areas.  Eye: Equal, round and reactive, conjunctiva clear, lids normal.  ENMT: Lips without lesions, good dentition, oropharynx clear.  Neck: Trachea midline, no masses, no thyromegaly. No cervical or supraclavicular lymphadenopathy  Respiratory: Unlabored respiratory effort, lungs clear to auscultation, no wheezes, no ronchi.  Cardiovascular: Normal S1, S2, no murmur, no leg edema.  Psych: Alert and oriented x3, normal affect and mood.    Assessment and Plan:   The following treatment plan was discussed    1. Wellness examination    - CBC WITH DIFFERENTIAL; Future  - Comp Metabolic Panel; Future  - TSH WITH REFLEX TO FT4; Future  - Lipid Profile; Future    2. Chronic pain in right shoulder    - REFERRAL TO PHYSICAL THERAPY Reason for Therapy: Eval/Treat/Report    3. Chronic pain of right hip    - REFERRAL TO PHYSICAL THERAPY Reason for Therapy: Eval/Treat/Report    4. Chronic bilateral low back pain without sciatica    - REFERRAL TO PHYSICAL THERAPY Reason for Therapy: Eval/Treat/Report    5. Chronic dryness of both eyes    - REFERRAL TO OPHTHALMOLOGY    6. Rosacea    - REFERRAL TO DERMATOLOGY    7. Acne, unspecified acne type    - REFERRAL TO DERMATOLOGY    8. Fatigue, unspecified type    - VITAMIN D,25 HYDROXY; Future  - IRON/TOTAL IRON BIND; Future  - FERRITIN; Future  - VITAMIN B12; Future    9. Recurrent vaginitis    - fluconazole (DIFLUCAN) 150 MG tablet; Take 1 Tab by mouth every day for 10 days.  Dispense: 10 Tab; Refill: 0      Followup: Return in about 1 year (around 5/1/2020).         "

## 2019-05-01 NOTE — ASSESSMENT & PLAN NOTE
Evaluated with optometrist a year ago. rec otc drops but if that didn't help to go to a dry eye clinic. Patient would like to do that now.

## 2019-05-01 NOTE — ASSESSMENT & PLAN NOTE
Worse over 3 months. Busy with work and school. Doesn't feel depressed. Eats mostly vegan diet. Takes multivitamin sometimes. No easy bruising or bleeding. No weight change. No fever/chills or night sweats.

## 2019-05-01 NOTE — ASSESSMENT & PLAN NOTE
Recurrent yeast infections d/t daily doxycycline. Uses otc monistat but would like to have a rx for diflucan to use prn

## 2019-05-14 ENCOUNTER — HOSPITAL ENCOUNTER (OUTPATIENT)
Dept: LAB | Facility: MEDICAL CENTER | Age: 39
End: 2019-05-14
Attending: PHYSICIAN ASSISTANT
Payer: COMMERCIAL

## 2019-05-14 DIAGNOSIS — R53.83 FATIGUE, UNSPECIFIED TYPE: ICD-10-CM

## 2019-05-14 DIAGNOSIS — Z00.00 WELLNESS EXAMINATION: ICD-10-CM

## 2019-05-14 LAB
25(OH)D3 SERPL-MCNC: 23 NG/ML (ref 30–100)
ALBUMIN SERPL BCP-MCNC: 4.4 G/DL (ref 3.2–4.9)
ALBUMIN/GLOB SERPL: 1.6 G/DL
ALP SERPL-CCNC: 43 U/L (ref 30–99)
ALT SERPL-CCNC: 9 U/L (ref 2–50)
ANION GAP SERPL CALC-SCNC: 7 MMOL/L (ref 0–11.9)
AST SERPL-CCNC: 15 U/L (ref 12–45)
BASOPHILS # BLD AUTO: 1 % (ref 0–1.8)
BASOPHILS # BLD: 0.04 K/UL (ref 0–0.12)
BILIRUB SERPL-MCNC: 0.6 MG/DL (ref 0.1–1.5)
BUN SERPL-MCNC: 13 MG/DL (ref 8–22)
CALCIUM SERPL-MCNC: 9.4 MG/DL (ref 8.5–10.5)
CHLORIDE SERPL-SCNC: 105 MMOL/L (ref 96–112)
CHOLEST SERPL-MCNC: 205 MG/DL (ref 100–199)
CO2 SERPL-SCNC: 28 MMOL/L (ref 20–33)
CREAT SERPL-MCNC: 0.64 MG/DL (ref 0.5–1.4)
EOSINOPHIL # BLD AUTO: 0.1 K/UL (ref 0–0.51)
EOSINOPHIL NFR BLD: 2.6 % (ref 0–6.9)
ERYTHROCYTE [DISTWIDTH] IN BLOOD BY AUTOMATED COUNT: 40.4 FL (ref 35.9–50)
FASTING STATUS PATIENT QL REPORTED: NORMAL
FERRITIN SERPL-MCNC: 42.5 NG/ML (ref 10–291)
GLOBULIN SER CALC-MCNC: 2.8 G/DL (ref 1.9–3.5)
GLUCOSE SERPL-MCNC: 82 MG/DL (ref 65–99)
HCT VFR BLD AUTO: 41.1 % (ref 37–47)
HDLC SERPL-MCNC: 66 MG/DL
HGB BLD-MCNC: 13.5 G/DL (ref 12–16)
IMM GRANULOCYTES # BLD AUTO: 0.01 K/UL (ref 0–0.11)
IMM GRANULOCYTES NFR BLD AUTO: 0.3 % (ref 0–0.9)
IRON SATN MFR SERPL: 28 % (ref 15–55)
IRON SERPL-MCNC: 95 UG/DL (ref 40–170)
LDLC SERPL CALC-MCNC: 124 MG/DL
LYMPHOCYTES # BLD AUTO: 1.44 K/UL (ref 1–4.8)
LYMPHOCYTES NFR BLD: 37.3 % (ref 22–41)
MCH RBC QN AUTO: 30.8 PG (ref 27–33)
MCHC RBC AUTO-ENTMCNC: 32.8 G/DL (ref 33.6–35)
MCV RBC AUTO: 93.6 FL (ref 81.4–97.8)
MONOCYTES # BLD AUTO: 0.35 K/UL (ref 0–0.85)
MONOCYTES NFR BLD AUTO: 9.1 % (ref 0–13.4)
NEUTROPHILS # BLD AUTO: 1.92 K/UL (ref 2–7.15)
NEUTROPHILS NFR BLD: 49.7 % (ref 44–72)
NRBC # BLD AUTO: 0 K/UL
NRBC BLD-RTO: 0 /100 WBC
PLATELET # BLD AUTO: 252 K/UL (ref 164–446)
PMV BLD AUTO: 10.6 FL (ref 9–12.9)
POTASSIUM SERPL-SCNC: 4.2 MMOL/L (ref 3.6–5.5)
PROT SERPL-MCNC: 7.2 G/DL (ref 6–8.2)
RBC # BLD AUTO: 4.39 M/UL (ref 4.2–5.4)
SODIUM SERPL-SCNC: 140 MMOL/L (ref 135–145)
TIBC SERPL-MCNC: 342 UG/DL (ref 250–450)
TRIGL SERPL-MCNC: 74 MG/DL (ref 0–149)
TSH SERPL DL<=0.005 MIU/L-ACNC: 2.38 UIU/ML (ref 0.38–5.33)
VIT B12 SERPL-MCNC: 184 PG/ML (ref 211–911)
WBC # BLD AUTO: 3.9 K/UL (ref 4.8–10.8)

## 2019-05-14 PROCEDURE — 82306 VITAMIN D 25 HYDROXY: CPT

## 2019-05-14 PROCEDURE — 84443 ASSAY THYROID STIM HORMONE: CPT

## 2019-05-14 PROCEDURE — 83550 IRON BINDING TEST: CPT

## 2019-05-14 PROCEDURE — 82607 VITAMIN B-12: CPT

## 2019-05-14 PROCEDURE — 83540 ASSAY OF IRON: CPT

## 2019-05-14 PROCEDURE — 85025 COMPLETE CBC W/AUTO DIFF WBC: CPT

## 2019-05-14 PROCEDURE — 80061 LIPID PANEL: CPT

## 2019-05-14 PROCEDURE — 82728 ASSAY OF FERRITIN: CPT

## 2019-05-14 PROCEDURE — 36415 COLL VENOUS BLD VENIPUNCTURE: CPT

## 2019-05-14 PROCEDURE — 80053 COMPREHEN METABOLIC PANEL: CPT

## 2019-08-14 ENCOUNTER — OFFICE VISIT (OUTPATIENT)
Dept: URGENT CARE | Facility: CLINIC | Age: 39
End: 2019-08-14
Payer: COMMERCIAL

## 2019-08-14 VITALS
WEIGHT: 126.4 LBS | TEMPERATURE: 98.9 F | HEART RATE: 62 BPM | SYSTOLIC BLOOD PRESSURE: 90 MMHG | OXYGEN SATURATION: 100 % | DIASTOLIC BLOOD PRESSURE: 54 MMHG | HEIGHT: 66 IN | BODY MASS INDEX: 20.31 KG/M2

## 2019-08-14 DIAGNOSIS — S16.1XXA ACUTE STRAIN OF NECK MUSCLE, INITIAL ENCOUNTER: ICD-10-CM

## 2019-08-14 PROCEDURE — 99213 OFFICE O/P EST LOW 20 MIN: CPT | Performed by: PHYSICIAN ASSISTANT

## 2019-08-14 ASSESSMENT — ENCOUNTER SYMPTOMS
TROUBLE SWALLOWING: 0
PARESIS: 0
NUMBNESS: 0
WEAKNESS: 0
PALPITATIONS: 0
BACK PAIN: 0
SENSORY CHANGE: 0
SHORTNESS OF BREATH: 0
FOCAL WEAKNESS: 0
COUGH: 0
PHOTOPHOBIA: 0
VOMITING: 0
NECK PAIN: 1
NAUSEA: 0
HEADACHES: 0
FEVER: 0
TINGLING: 1
MYALGIAS: 1
CHILLS: 0
VISUAL CHANGE: 0

## 2019-08-14 ASSESSMENT — PAIN SCALES - GENERAL: PAINLEVEL: 3=SLIGHT PAIN
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
## 2019-08-28 ENCOUNTER — OFFICE VISIT (OUTPATIENT)
Dept: MEDICAL GROUP | Facility: MEDICAL CENTER | Age: 39
End: 2019-08-28
Payer: COMMERCIAL

## 2019-08-28 VITALS
DIASTOLIC BLOOD PRESSURE: 62 MMHG | BODY MASS INDEX: 20.15 KG/M2 | WEIGHT: 125.4 LBS | OXYGEN SATURATION: 99 % | HEART RATE: 69 BPM | TEMPERATURE: 98.4 F | HEIGHT: 66 IN | SYSTOLIC BLOOD PRESSURE: 102 MMHG | RESPIRATION RATE: 14 BRPM

## 2019-08-28 DIAGNOSIS — S16.1XXA STRAIN OF NECK MUSCLE, INITIAL ENCOUNTER: ICD-10-CM
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
## 2019-08-28 DIAGNOSIS — L70.9 ACNE, UNSPECIFIED ACNE TYPE: ICD-10-CM

## 2019-08-28 PROCEDURE — 99214 OFFICE O/P EST MOD 30 MIN: CPT | Performed by: PHYSICIAN ASSISTANT

## 2019-08-28 RX ORDER — CYCLOBENZAPRINE HCL 5 MG
5-10 TABLET ORAL NIGHTLY PRN
Qty: 10 TAB | Refills: 0 | Status: SHIPPED | OUTPATIENT
Start: 2019-08-28 | End: 2020-06-29

## 2019-08-28 RX ORDER — DROSPIRENONE AND ETHINYL ESTRADIOL 0.02-3(28)
1 KIT ORAL DAILY
Qty: 28 TAB | Refills: 11 | Status: SHIPPED
Start: 2019-08-28 | End: 2020-02-25
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
## 2019-08-29 NOTE — ASSESSMENT & PLAN NOTE
Considering accutane. Would like to go on BCPs first. Discussed risk/benefit and potential side effects. Will f/u prn.

## 2019-09-23 ENCOUNTER — IMMUNIZATION (OUTPATIENT)
Dept: OCCUPATIONAL MEDICINE | Facility: CLINIC | Age: 39
End: 2019-09-23

## 2019-09-23 DIAGNOSIS — Z23 NEED FOR VACCINATION: ICD-10-CM

## 2019-09-23 PROCEDURE — 90686 IIV4 VACC NO PRSV 0.5 ML IM: CPT | Performed by: PREVENTIVE MEDICINE

## 2019-12-18 ENCOUNTER — TELEPHONE (OUTPATIENT)
Dept: MEDICAL GROUP | Facility: MEDICAL CENTER | Age: 39
End: 2019-12-18

## 2019-12-18 DIAGNOSIS — Z12.4 SCREENING FOR CERVICAL CANCER: ICD-10-CM

## 2019-12-19 NOTE — TELEPHONE ENCOUNTER
1. Caller Name: Vanessa Mora                                           Call Back Number: [phone] (home)         Patient approves a detailed voicemail message: N\A    2. SPECIFIC Action To Be Taken: Referral pending, please sign.    3. Diagnosis/Clinical Reason for Request: well woman check    4. Specialty & Provider Name/Lab/Imaging Location: ob/gyn    5. Is appointment scheduled for requested order/referral: no    Patient was informed they will receive a return phone call from the office ONLY if there are any questions before processing their request. Advised to call back if they haven't received a call from the referral department in 5 days.

## 2020-02-22 ENCOUNTER — NON-PROVIDER VISIT (OUTPATIENT)
Dept: URGENT CARE | Facility: CLINIC | Age: 40
End: 2020-02-22

## 2020-02-22 DIAGNOSIS — Z11.1 ENCOUNTER FOR PPD SKIN TEST READING: ICD-10-CM

## 2020-02-22 PROCEDURE — 86580 TB INTRADERMAL TEST: CPT | Performed by: PHYSICIAN ASSISTANT

## 2020-02-23 NOTE — PROGRESS NOTES
1. TB evaluation form completed by patient.     2. Pt notified to return to clinic for reading within 24-48 hours.     3. PPD placement documentation completed on TB questionnaire    4. TB Eval form filed at

## 2020-02-24 ENCOUNTER — NON-PROVIDER VISIT (OUTPATIENT)
Dept: URGENT CARE | Facility: CLINIC | Age: 40
End: 2020-02-24

## 2020-02-24 LAB — TB WHEAL 3D P 5 TU DIAM: 0 MM

## 2020-02-25 ENCOUNTER — HOSPITAL ENCOUNTER (OUTPATIENT)
Facility: MEDICAL CENTER | Age: 40
End: 2020-02-25
Attending: OBSTETRICS & GYNECOLOGY
Payer: COMMERCIAL

## 2020-02-25 ENCOUNTER — GYNECOLOGY VISIT (OUTPATIENT)
Dept: OBGYN | Facility: CLINIC | Age: 40
End: 2020-02-25
Payer: COMMERCIAL

## 2020-02-25 VITALS — SYSTOLIC BLOOD PRESSURE: 109 MMHG | BODY MASS INDEX: 20.01 KG/M2 | WEIGHT: 124 LBS | DIASTOLIC BLOOD PRESSURE: 66 MMHG

## 2020-02-25 DIAGNOSIS — Z11.51 SCREENING FOR HUMAN PAPILLOMAVIRUS (HPV): ICD-10-CM

## 2020-02-25 DIAGNOSIS — Z30.41 ENCOUNTER FOR SURVEILLANCE OF CONTRACEPTIVE PILLS: ICD-10-CM

## 2020-02-25 DIAGNOSIS — Z01.419 WELL WOMAN EXAM WITH ROUTINE GYNECOLOGICAL EXAM: ICD-10-CM

## 2020-02-25 DIAGNOSIS — Z11.3 SCREENING EXAMINATION FOR VENEREAL DISEASE: ICD-10-CM

## 2020-02-25 DIAGNOSIS — Z12.4 SCREENING FOR CERVICAL CANCER: ICD-10-CM

## 2020-02-25 PROCEDURE — 87624 HPV HI-RISK TYP POOLED RSLT: CPT

## 2020-02-25 PROCEDURE — 88175 CYTOPATH C/V AUTO FLUID REDO: CPT

## 2020-02-25 PROCEDURE — 87491 CHLMYD TRACH DNA AMP PROBE: CPT

## 2020-02-25 PROCEDURE — 99395 PREV VISIT EST AGE 18-39: CPT | Performed by: OBSTETRICS & GYNECOLOGY

## 2020-02-25 PROCEDURE — 87591 N.GONORRHOEAE DNA AMP PROB: CPT

## 2020-02-25 RX ORDER — DROSPIRENONE AND ETHINYL ESTRADIOL 0.02-3(28)
1 KIT ORAL DAILY
Qty: 84 TAB | Refills: 4 | Status: SHIPPED | OUTPATIENT
Start: 2020-02-25 | End: 2020-06-29

## 2020-02-25 NOTE — PROGRESS NOTES
Subjective:      Vanessa Mora is a 39 y.o. female who presents with Gynecologic Exam (Annual)        CC: annual exam    HPI: 39-year-old  1 para 0-0-1-0, last menstrual period 2020, using Gordo for contraception presents for annual exam.  She is concerned she may have had a recent yeast infection, and treated with Monistat cream.  The symptoms started approximately 5 days ago.  She describes white vaginal discharge.  She denies odor, vaginal irritation, or itching.  She states she was on antibiotics for 1 and half years for rosacea, and then started having chronic yeast infections.  She denies abnormal Pap smears or STDs.  Family history is remarkable for a maternal aunt with breast cancer in her 50s and a paternal grandmother with breast cancer.  She is taking Gordo for contraception.  She denies history of migraine with aura, DVT, liver dysfunction, hypertension, or tobacco use.    History reviewed. No pertinent past medical history.    History reviewed. No pertinent surgical history.      Current Outpatient Medications:   •  drospirenone-ethinyl estradiol (GORDO) 3-0.02 MG per tablet, Take 1 Tab by mouth every day., Disp: 84 Tab, Rfl: 4  •  cyclobenzaprine (FLEXERIL) 5 MG tablet, Take 1-2 Tabs by mouth at bedtime as needed for Muscle Spasms., Disp: 10 Tab, Rfl: 0  •  Doxycycline Hyclate 50 MG Tab, Take  by mouth., Disp: , Rfl:     Patient has no known allergies.    Family History   Problem Relation Age of Onset   • No Known Problems Mother    • No Known Problems Father    • No Known Problems Sister    • Cancer Paternal Grandmother 48        Breast   • No Known Problems Sister    • Other Sister         MS       Social History     Socioeconomic History   • Marital status:      Spouse name: Not on file   • Number of children: Not on file   • Years of education: Not on file   • Highest education level: Not on file   Occupational History   • Not on file   Social Needs   • Financial resource strain: Not  on file   • Food insecurity     Worry: Not on file     Inability: Not on file   • Transportation needs     Medical: Not on file     Non-medical: Not on file   Tobacco Use   • Smoking status: Never Smoker   • Smokeless tobacco: Never Used   Substance and Sexual Activity   • Alcohol use: Yes     Alcohol/week: 3.0 oz     Types: 5 Glasses of wine per week   • Drug use: No   • Sexual activity: Yes     Partners: Male   Lifestyle   • Physical activity     Days per week: Not on file     Minutes per session: Not on file   • Stress: Not on file   Relationships   • Social connections     Talks on phone: Not on file     Gets together: Not on file     Attends Episcopalian service: Not on file     Active member of club or organization: Not on file     Attends meetings of clubs or organizations: Not on file     Relationship status: Not on file   • Intimate partner violence     Fear of current or ex partner: Not on file     Emotionally abused: Not on file     Physically abused: Not on file     Forced sexual activity: Not on file   Other Topics Concern   • Not on file   Social History Narrative   • Not on file       OB History    Para Term  AB Living   1 0 0 0 1 0   SAB TAB Ectopic Molar Multiple Live Births   0 1 0 0 0 0       ROS REVIEW OF SYSTEMS:    Pertinent positives and negatives mentioned in HPI.    All other systems reviewed and are negative or noncontributory.       Objective:     /66   Wt 56.2 kg (124 lb)   LMP 2020   BMI 20.01 kg/m²      Physical Exam      GENERAL: Alert, in no apparent distress  PSYCHIATRIC: Appropriate affect, intact insight and judgement.  NECK:  Nontender, no masses.  No Thyromegaly or nodules. No lymphadenopathy.  RESPIRATORY: Normal respiratory effort.  Lungs clear to auscultation.   CARDIOVASCULAR: RRR, no murmur, gallop, or rub.  ABDOMEN: Soft, nontender, nondistended.  No palpable masses.  No rebound or guarding.  No inguinal lymphadenopathy.  No hepatosplenomegaly.  No  hernias.  BACK: No CVA tenderness  EXTREMITIES: No edema  SKIN: No rash    BREAST: No masses or tenderness.  No skin changes.  No nipple inversion or discharge. No axillary lymphadenopathy.      GENITOURINARY:  Normal external genitalia, no lesions.  Normal urethral meatus, no masses or tenderness.  Normal bladder without fullness or masses.  Vagina well estrogenized, thick white vaginal discharge is present, possibly consistent with OTC yeast cream.  Cervix without lesions or discharge, nontender.  Uterus normal size, shape, and contour, nontender.  Adnexa nontender, no masses.  Normal anus and perineum.    Rectal Exam - not indicated.       Assessment/Plan:       1. Well woman exam with routine gynecological exam  Reviewed monthly self breast exams and need for yearly screening mammograms starting at age 40.  Discussed calcium intake, Vitamin D,  and weight bearing exercise for bone health.      2. Screening for cervical cancer  - THINPREP PAP W/HPV AND CTNG; Future    3. Screening for human papillomavirus (HPV)  - THINPREP PAP W/HPV AND CTNG; Future    4. Screening examination for venereal disease  - THINPREP PAP W/HPV AND CTNG; Future    5. Encounter for surveillance of contraceptive pills  Radha refilled.  No contraindications are present.  Reviewed risks of DVT associated with OCP use.    F/U prn

## 2020-02-26 DIAGNOSIS — Z12.4 SCREENING FOR CERVICAL CANCER: ICD-10-CM

## 2020-02-26 DIAGNOSIS — Z11.3 SCREENING EXAMINATION FOR VENEREAL DISEASE: ICD-10-CM

## 2020-02-26 DIAGNOSIS — Z11.51 SCREENING FOR HUMAN PAPILLOMAVIRUS (HPV): ICD-10-CM

## 2020-02-28 LAB
C TRACH DNA GENITAL QL NAA+PROBE: NEGATIVE
CYTOLOGY REG CYTOL: NORMAL
HPV HR 12 DNA CVX QL NAA+PROBE: NEGATIVE
HPV16 DNA SPEC QL NAA+PROBE: NEGATIVE
HPV18 DNA SPEC QL NAA+PROBE: NEGATIVE
N GONORRHOEA DNA GENITAL QL NAA+PROBE: NEGATIVE
SPECIMEN SOURCE: NORMAL
SPECIMEN SOURCE: NORMAL

## 2020-04-16 ENCOUNTER — NON-PROVIDER VISIT (OUTPATIENT)
Dept: OCCUPATIONAL MEDICINE | Facility: CLINIC | Age: 40
End: 2020-04-16

## 2020-04-16 DIAGNOSIS — Z01.89 RESPIRATORY CLEARANCE EXAMINATION, ENCOUNTER FOR: ICD-10-CM

## 2020-04-16 PROCEDURE — 94375 RESPIRATORY FLOW VOLUME LOOP: CPT | Performed by: NURSE PRACTITIONER

## 2020-06-29 ENCOUNTER — OFFICE VISIT (OUTPATIENT)
Dept: MEDICAL GROUP | Facility: MEDICAL CENTER | Age: 40
End: 2020-06-29
Payer: COMMERCIAL

## 2020-06-29 VITALS
DIASTOLIC BLOOD PRESSURE: 60 MMHG | SYSTOLIC BLOOD PRESSURE: 98 MMHG | HEART RATE: 63 BPM | TEMPERATURE: 97.7 F | OXYGEN SATURATION: 99 % | WEIGHT: 127.65 LBS | BODY MASS INDEX: 20.51 KG/M2 | HEIGHT: 66 IN

## 2020-06-29 DIAGNOSIS — Z30.09 COUNSELING FOR BIRTH CONTROL, ORAL CONTRACEPTIVES: ICD-10-CM

## 2020-06-29 DIAGNOSIS — Z13.6 SCREENING FOR CARDIOVASCULAR CONDITION: ICD-10-CM

## 2020-06-29 DIAGNOSIS — E55.9 VITAMIN D DEFICIENCY: ICD-10-CM

## 2020-06-29 DIAGNOSIS — Z86.2 HISTORY OF ANEMIA: ICD-10-CM

## 2020-06-29 DIAGNOSIS — H04.123 CHRONIC DRYNESS OF BOTH EYES: ICD-10-CM

## 2020-06-29 DIAGNOSIS — H57.89 REDNESS OF RIGHT EYE: ICD-10-CM

## 2020-06-29 DIAGNOSIS — R53.83 FATIGUE, UNSPECIFIED TYPE: ICD-10-CM

## 2020-06-29 DIAGNOSIS — Z00.00 WELLNESS EXAMINATION: ICD-10-CM

## 2020-06-29 DIAGNOSIS — R09.81 CHRONIC NASAL CONGESTION: ICD-10-CM

## 2020-06-29 DIAGNOSIS — E53.8 B12 DEFICIENCY: ICD-10-CM

## 2020-06-29 PROBLEM — M25.551 CHRONIC PAIN OF RIGHT HIP: Status: RESOLVED | Noted: 2019-05-01 | Resolved: 2020-06-29

## 2020-06-29 PROBLEM — N76.0 RECURRENT VAGINITIS: Status: RESOLVED | Noted: 2019-05-01 | Resolved: 2020-06-29

## 2020-06-29 PROBLEM — G89.29 CHRONIC PAIN OF RIGHT HIP: Status: RESOLVED | Noted: 2019-05-01 | Resolved: 2020-06-29

## 2020-06-29 PROBLEM — M54.50 CHRONIC BILATERAL LOW BACK PAIN WITHOUT SCIATICA: Status: RESOLVED | Noted: 2019-05-01 | Resolved: 2020-06-29

## 2020-06-29 PROBLEM — G89.29 CHRONIC PAIN IN RIGHT SHOULDER: Status: RESOLVED | Noted: 2019-05-01 | Resolved: 2020-06-29

## 2020-06-29 PROBLEM — G89.29 CHRONIC BILATERAL LOW BACK PAIN WITHOUT SCIATICA: Status: RESOLVED | Noted: 2019-05-01 | Resolved: 2020-06-29

## 2020-06-29 PROBLEM — M25.511 CHRONIC PAIN IN RIGHT SHOULDER: Status: RESOLVED | Noted: 2019-05-01 | Resolved: 2020-06-29

## 2020-06-29 PROCEDURE — 99395 PREV VISIT EST AGE 18-39: CPT | Performed by: PHYSICIAN ASSISTANT

## 2020-06-29 RX ORDER — NORGESTREL AND ETHINYL ESTRADIOL 0.3-0.03MG
1 KIT ORAL DAILY
Qty: 28 TAB | Refills: 11 | Status: SHIPPED | OUTPATIENT
Start: 2020-06-29 | End: 2021-08-10

## 2020-06-29 ASSESSMENT — FIBROSIS 4 INDEX: FIB4 SCORE: 0.75

## 2020-06-29 ASSESSMENT — PATIENT HEALTH QUESTIONNAIRE - PHQ9: CLINICAL INTERPRETATION OF PHQ2 SCORE: 0

## 2020-06-29 NOTE — PROGRESS NOTES
"Subjective:   Vanessa Mora is a 40 y.o. female here today for annual wellness visit. Feeling well overall. No recent illness or injury. 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 Stopped GORDO because of breast tenderness. Would like to consider a different BCP to help with skin. Had a 3 month skipped period but did get it again this month. Home pregnancy testing negative. Due for labs. Taking vitamins. Still fatigued. Having trouble with right eye redness.     Current medicines (including changes today)  Current Outpatient Medications   Medication Sig Dispense Refill   • norgestrel-ethinyl estradiol (LOW-OGESTREL) 0.3-30 MG-MCG Tab Take 1 Tab by mouth every day. 28 Tab 11     No current facility-administered medications for this visit.      She  has no past medical history on file.    ROS   No fever/chills. No weight change. No headache/dizziness. No focal weakness. No sore throat, nasal congestion, ear pain. No chest pain, no shortness of breath, difficulty breathing. No n/v/d/c or abdominal pain. No urinary complaint. No rash or skin lesion. No joint pain or swelling.       Objective:     BP (!) 98/60 (BP Location: Left arm, Patient Position: Sitting, BP Cuff Size: Adult long)   Pulse 63   Temp 36.5 °C (97.7 °F) (Temporal)   Ht 1.676 m (5' 6\")   Wt 57.9 kg (127 lb 10.3 oz)   SpO2 99%  Body mass index is 20.6 kg/m².   Physical Exam:  Constitutional: WDWN, NAD  Skin: Warm, dry, good turgor, no rashes in visible areas.  Eye: Equal, round and reactive, conjunctiva clear, lids normal.  ENMT: Lips without lesions, good dentition, oropharynx clear.  Neck: Trachea midline, no masses, no thyromegaly. No cervical or supraclavicular lymphadenopathy  Respiratory: Unlabored respiratory effort, lungs clear to auscultation, no wheezes, no ronchi.  Cardiovascular: Normal S1, S2, no murmur, no leg edema.  Psych: Alert and oriented x3, normal affect and mood.    Assessment and Plan:   The following treatment plan was " discussed    1. Wellness examination    - CBC WITH DIFFERENTIAL; Future  - Comp Metabolic Panel; Future  - TSH WITH REFLEX TO FT4; Future    2. Screening for cardiovascular condition    - Lipid Profile; Future    3. Fatigue, unspecified type      4. B12 deficiency    - VITAMIN B12; Future    5. Vitamin D deficiency    - VITAMIN D,25 HYDROXY; Future    6. History of anemia    - IRON/TOTAL IRON BIND; Future  - FERRITIN; Future    7. Redness of right eye    - REFERRAL TO ALLERGY  - REFERRAL TO OPHTHALMOLOGY    8. Chronic dryness of both eyes    - REFERRAL TO ALLERGY  - REFERRAL TO OPHTHALMOLOGY    9. Chronic nasal congestion    - REFERRAL TO ALLERGY    10. Counseling for birth control, oral contraceptives    - norgestrel-ethinyl estradiol (LOW-OGESTREL) 0.3-30 MG-MCG Tab; Take 1 Tab by mouth every day.  Dispense: 28 Tab; Refill: 11      Followup: yearly and as needed

## 2020-07-28 ENCOUNTER — HOSPITAL ENCOUNTER (OUTPATIENT)
Dept: LAB | Facility: MEDICAL CENTER | Age: 40
End: 2020-07-28
Attending: PHYSICIAN ASSISTANT
Payer: COMMERCIAL

## 2020-07-28 DIAGNOSIS — Z13.6 SCREENING FOR CARDIOVASCULAR CONDITION: ICD-10-CM

## 2020-07-28 DIAGNOSIS — Z86.2 HISTORY OF ANEMIA: ICD-10-CM

## 2020-07-28 DIAGNOSIS — E55.9 VITAMIN D DEFICIENCY: ICD-10-CM

## 2020-07-28 DIAGNOSIS — E53.8 B12 DEFICIENCY: ICD-10-CM

## 2020-07-28 DIAGNOSIS — Z00.00 WELLNESS EXAMINATION: ICD-10-CM

## 2020-07-28 LAB
25(OH)D3 SERPL-MCNC: 32 NG/ML (ref 30–100)
ALBUMIN SERPL BCP-MCNC: 4.4 G/DL (ref 3.2–4.9)
ALBUMIN/GLOB SERPL: 1.8 G/DL
ALP SERPL-CCNC: 44 U/L (ref 30–99)
ALT SERPL-CCNC: 12 U/L (ref 2–50)
ANION GAP SERPL CALC-SCNC: 10 MMOL/L (ref 7–16)
AST SERPL-CCNC: 14 U/L (ref 12–45)
BASOPHILS # BLD AUTO: 1.2 % (ref 0–1.8)
BASOPHILS # BLD: 0.05 K/UL (ref 0–0.12)
BILIRUB SERPL-MCNC: 0.4 MG/DL (ref 0.1–1.5)
BUN SERPL-MCNC: 10 MG/DL (ref 8–22)
CALCIUM SERPL-MCNC: 8.9 MG/DL (ref 8.5–10.5)
CHLORIDE SERPL-SCNC: 103 MMOL/L (ref 96–112)
CHOLEST SERPL-MCNC: 169 MG/DL (ref 100–199)
CO2 SERPL-SCNC: 24 MMOL/L (ref 20–33)
CREAT SERPL-MCNC: 0.68 MG/DL (ref 0.5–1.4)
EOSINOPHIL # BLD AUTO: 0.14 K/UL (ref 0–0.51)
EOSINOPHIL NFR BLD: 3.3 % (ref 0–6.9)
ERYTHROCYTE [DISTWIDTH] IN BLOOD BY AUTOMATED COUNT: 39.4 FL (ref 35.9–50)
FASTING STATUS PATIENT QL REPORTED: NORMAL
FERRITIN SERPL-MCNC: 48.9 NG/ML (ref 10–291)
GLOBULIN SER CALC-MCNC: 2.5 G/DL (ref 1.9–3.5)
GLUCOSE SERPL-MCNC: 86 MG/DL (ref 65–99)
HCT VFR BLD AUTO: 40.4 % (ref 37–47)
HDLC SERPL-MCNC: 63 MG/DL
HGB BLD-MCNC: 13.4 G/DL (ref 12–16)
IMM GRANULOCYTES # BLD AUTO: 0.01 K/UL (ref 0–0.11)
IMM GRANULOCYTES NFR BLD AUTO: 0.2 % (ref 0–0.9)
IRON SATN MFR SERPL: 33 % (ref 15–55)
IRON SERPL-MCNC: 90 UG/DL (ref 40–170)
LDLC SERPL CALC-MCNC: 92 MG/DL
LYMPHOCYTES # BLD AUTO: 1.77 K/UL (ref 1–4.8)
LYMPHOCYTES NFR BLD: 41.5 % (ref 22–41)
MCH RBC QN AUTO: 30.8 PG (ref 27–33)
MCHC RBC AUTO-ENTMCNC: 33.2 G/DL (ref 33.6–35)
MCV RBC AUTO: 92.9 FL (ref 81.4–97.8)
MONOCYTES # BLD AUTO: 0.4 K/UL (ref 0–0.85)
MONOCYTES NFR BLD AUTO: 9.4 % (ref 0–13.4)
NEUTROPHILS # BLD AUTO: 1.89 K/UL (ref 2–7.15)
NEUTROPHILS NFR BLD: 44.4 % (ref 44–72)
NRBC # BLD AUTO: 0 K/UL
NRBC BLD-RTO: 0 /100 WBC
PLATELET # BLD AUTO: 228 K/UL (ref 164–446)
PMV BLD AUTO: 11.2 FL (ref 9–12.9)
POTASSIUM SERPL-SCNC: 4.2 MMOL/L (ref 3.6–5.5)
PROT SERPL-MCNC: 6.9 G/DL (ref 6–8.2)
RBC # BLD AUTO: 4.35 M/UL (ref 4.2–5.4)
SODIUM SERPL-SCNC: 137 MMOL/L (ref 135–145)
TIBC SERPL-MCNC: 270 UG/DL (ref 250–450)
TRIGL SERPL-MCNC: 70 MG/DL (ref 0–149)
TSH SERPL DL<=0.005 MIU/L-ACNC: 1.71 UIU/ML (ref 0.38–5.33)
UIBC SERPL-MCNC: 180 UG/DL (ref 110–370)
VIT B12 SERPL-MCNC: 271 PG/ML (ref 211–911)
WBC # BLD AUTO: 4.3 K/UL (ref 4.8–10.8)

## 2020-07-28 PROCEDURE — 83540 ASSAY OF IRON: CPT

## 2020-07-28 PROCEDURE — 82607 VITAMIN B-12: CPT

## 2020-07-28 PROCEDURE — 82306 VITAMIN D 25 HYDROXY: CPT

## 2020-07-28 PROCEDURE — 83550 IRON BINDING TEST: CPT

## 2020-07-28 PROCEDURE — 84443 ASSAY THYROID STIM HORMONE: CPT

## 2020-07-28 PROCEDURE — 36415 COLL VENOUS BLD VENIPUNCTURE: CPT

## 2020-07-28 PROCEDURE — 80053 COMPREHEN METABOLIC PANEL: CPT

## 2020-07-28 PROCEDURE — 80061 LIPID PANEL: CPT

## 2020-07-28 PROCEDURE — 82728 ASSAY OF FERRITIN: CPT

## 2020-07-28 PROCEDURE — 85025 COMPLETE CBC W/AUTO DIFF WBC: CPT

## 2020-12-13 ENCOUNTER — HOSPITAL ENCOUNTER (EMERGENCY)
Facility: MEDICAL CENTER | Age: 40
End: 2020-12-13
Attending: EMERGENCY MEDICINE | Admitting: EMERGENCY MEDICINE
Payer: COMMERCIAL

## 2020-12-13 VITALS
BODY MASS INDEX: 19.29 KG/M2 | WEIGHT: 120 LBS | OXYGEN SATURATION: 100 % | HEART RATE: 80 BPM | SYSTOLIC BLOOD PRESSURE: 122 MMHG | TEMPERATURE: 97.9 F | RESPIRATION RATE: 16 BRPM | DIASTOLIC BLOOD PRESSURE: 79 MMHG | HEIGHT: 66 IN

## 2020-12-13 DIAGNOSIS — Z77.21 EXPOSURE TO BODY FLUIDS BY CONTAMINATED HYPODERMIC NEEDLE STICK: ICD-10-CM
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
## 2020-12-13 LAB
HBV CORE AB SERPL QL IA: NONREACTIVE
HBV SURFACE AB SERPL IA-ACNC: 172 MIU/ML (ref 0–10)
HBV SURFACE AG SER QL: ABNORMAL
HCV AB SER QL: ABNORMAL
HIV 1+2 AB+HIV1 P24 AG SERPL QL IA: ABNORMAL

## 2020-12-13 PROCEDURE — 90471 IMMUNIZATION ADMIN: CPT

## 2020-12-13 PROCEDURE — 87389 HIV-1 AG W/HIV-1&-2 AB AG IA: CPT

## 2020-12-13 PROCEDURE — 87340 HEPATITIS B SURFACE AG IA: CPT

## 2020-12-13 PROCEDURE — 90715 TDAP VACCINE 7 YRS/> IM: CPT | Performed by: EMERGENCY MEDICINE

## 2020-12-13 PROCEDURE — 86706 HEP B SURFACE ANTIBODY: CPT

## 2020-12-13 PROCEDURE — 86803 HEPATITIS C AB TEST: CPT

## 2020-12-13 PROCEDURE — 86704 HEP B CORE ANTIBODY TOTAL: CPT

## 2020-12-13 PROCEDURE — 700111 HCHG RX REV CODE 636 W/ 250 OVERRIDE (IP): Performed by: EMERGENCY MEDICINE

## 2020-12-13 PROCEDURE — 99283 EMERGENCY DEPT VISIT LOW MDM: CPT

## 2020-12-13 RX ADMIN — CLOSTRIDIUM TETANI TOXOID ANTIGEN (FORMALDEHYDE INACTIVATED), CORYNEBACTERIUM DIPHTHERIAE TOXOID ANTIGEN (FORMALDEHYDE INACTIVATED), BORDETELLA PERTUSSIS TOXOID ANTIGEN (GLUTARALDEHYDE INACTIVATED), BORDETELLA PERTUSSIS FILAMENTOUS HEMAGGLUTININ ANTIGEN (FORMALDEHYDE INACTIVATED), BORDETELLA PERTUSSIS PERTACTIN ANTIGEN, AND BORDETELLA PERTUSSIS FIMBRIAE 2/3 ANTIGEN 0.5 ML: 5; 2; 2.5; 5; 3; 5 INJECTION, SUSPENSION INTRAMUSCULAR at 16:23

## 2020-12-13 ASSESSMENT — FIBROSIS 4 INDEX: FIB4 SCORE: .673575314054563392

## 2020-12-13 NOTE — ED TRIAGE NOTES
[facility]  .[facility]  Chief Complaint   Patient presents with   • Body Fluid Exposure     Ambulatory to ED for above with steady gait. A & O x 4, GCS 15. Mask in place.    Patient was giving a sub-Q heparin injection. The safety failed after injecting the patient, got stuck in her index finger. Endorses washing hands and cleaning site with alcohol prep pads.    Source patient MRN: [id number]

## 2020-12-13 NOTE — ED PROVIDER NOTES
ED Provider Note    CHIEF COMPLAINT  Chief Complaint   Patient presents with   • Body Fluid Exposure       HPI  Eight Rachel is a 40 y.o. female who presents to receiving a needlestick to her right index finger while injecting someone with subcu heparin.  The patient was wearing gloves at the time.  She states that the safety over the needle failed and she stuck her right finger.  She did have a small amount of bleeding which is now resolved.  The person she was stuck by is known and a needlestick panel was drawn from them.  The patient herself has no medical problems.  She is complaining of minimal pain at the tip of her right index finger.    REVIEW OF SYSTEMS  See HPI for further details.    PAST MEDICAL HISTORY  History reviewed. No pertinent past medical history.    SOCIAL HISTORY  Social History     Socioeconomic History   • Marital status:      Spouse name: Not on file   • Number of children: Not on file   • Years of education: Not on file   • Highest education level: Not on file   Occupational History   • Not on file   Social Needs   • Financial resource strain: Not on file   • Food insecurity     Worry: Not on file     Inability: Not on file   • Transportation needs     Medical: Not on file     Non-medical: Not on file   Tobacco Use   • Smoking status: Never Smoker   • Smokeless tobacco: Never Used   Substance and Sexual Activity   • Alcohol use: Yes     Alcohol/week: 3.0 oz     Types: 5 Glasses of wine per week     Comment: occasional   • Drug use: No   • Sexual activity: Yes     Partners: Male   Lifestyle   • Physical activity     Days per week: Not on file     Minutes per session: Not on file   • Stress: Not on file   Relationships   • Social connections     Talks on phone: Not on file     Gets together: Not on file     Attends Sikhism service: Not on file     Active member of club or organization: Not on file     Attends meetings of clubs or organizations: Not on file     Relationship status:  "Not on file   • Intimate partner violence     Fear of current or ex partner: Not on file     Emotionally abused: Not on file     Physically abused: Not on file     Forced sexual activity: Not on file   Other Topics Concern   • Not on file   Social History Narrative   • Not on file   , immunizations up to date, no smoking in the house    SURGICAL HISTORY  History reviewed. No pertinent surgical history.    CURRENT MEDICATIONS  Home Medications     Reviewed by Lalitha Mike R.N. (Registered Nurse) on 12/13/20 at 1503  Med List Status: Partial   Medication Last Dose Status   norgestrel-ethinyl estradiol (LOW-OGESTREL) 0.3-30 MG-MCG Tab  Active                ALLERGIES  No Known Allergies    PHYSICAL EXAM  VITAL SIGNS: /79   Pulse 80   Temp 36.6 °C (97.9 °F) (Temporal)   Resp 16   Ht 1.676 m (5' 6\")   Wt 54.4 kg (120 lb)   SpO2 100%   BMI 19.37 kg/m²   Constitutional: Well developed, Well nourished, No acute distress, Non-toxic appearance.   HEENT: Normocephalic, Atraumatic,  Abdomen: Bowel sounds normal, Soft, non tender, non distended,  No pulsatile masses., no rebound guarding or peritoneal signs.   Skin: Warm, Dry, No erythema, No rash,   Extremities: Equal, intact distal pulses, No cyanosis, No tenderness.  Unsure wound to right index finger without active bleeding or red streaking  Musculoskeletal: Good range of motion in all major joints. No tenderness to palpation or major deformities noted.   Neurologic: Alert & appropriate for age,    Psychiatric: Affect normal, smiling, good tone, non toxic         COURSE & MEDICAL DECISION MAKING  Pertinent Labs & Imaging studies reviewed. (See chart for details)  Differential diagnosis: Needlestick    The needlestick protocol was drawn on the source patient and will be drawn on this patient.  She will follow up with occupational health tomorrow for recheck.  Because it was a subcu heparin needle and she was wearing gloves this is a low risk exposure.  The " patient will be discharged home to follow-up with Workmen's Comp. tomorrow.    I did fill out the Workmen's Comp. Form.    The patient will return for new or worsening symptoms and is stable at the time of discharge.    The patient is referred to a primary physician for blood pressure management, diabetic screening, and for all other preventative health concerns.      DISPOSITION:  Patient will be discharged home in stable condition.    FOLLOW UP:  [address] [address] [phone]  Call in 1 day  for recheck      OUTPATIENT MEDICATIONS:  New Prescriptions    No medications on file         FINAL IMPRESSION  1. Exposure to body fluids by contaminated hypodermic needle stick           PLAN/DISPOSITION  discharged          Electronically signed by: Peg Birmingham M.D., 12/13/2020 3:14 PM

## 2020-12-13 NOTE — LETTER
"  FORM C-4:  EMPLOYEE’S CLAIM FOR COMPENSATION/ REPORT OF INITIAL TREATMENT  EMPLOYEE’S CLAIM - PROVIDE ALL INFORMATION REQUESTED   First Name Vanessa Last Name Morgan Birthdate 1980  Sex female Claim Number   Home Address [address]   [facility]             Zip [address]                                   Age  40 y.o. Height  1.676 m (5' 6\") Weight  54.4 kg (120 lb) [facility]  [id number]  [id number]   Mailing Address [address]  [facility]              Zip [address] Telephone  [phone] (home)  Primary Language Spoken  English   Insurer  Workers [employer] Third Party   N/A Employee's Occupation (Job Title) When Injury or Occupational Disease Occurred  RN   Employer's Name [employer] Telephone [phone]    Employer Address [address] Zip [address]   Date of Injury  12/13/2020       Hour of Injury  3:30 PM Date Employer Notified  12/13/2020 Last Day of Work after Injury or Occupational Disease  12/13/2020 Supervisor to Whom Injury Reported  [employer]   Address or Location of Accident (if applicable) [[facility]]   What were you doing at the time of accident? (if applicable) Patient Care    How did this injury or occupational disease occur? Be specific and answer in detail. Use additional sheet if necessary)  needle stick when safety on syb cu. injection needle failed.   If you believe that you have an occupational disease, when did you first have knowledge of the disability and it relationship to your employment? n/a Witnesses to the Accident  n/a   Nature of Injury or Occupational Disease  Workers' Compensation Part(s) of Body Injured or Affected  Hand (R), N/A, N/A    I CERTIFY THAT THE ABOVE IS TRUE AND CORRECT TO THE BEST OF MY KNOWLEDGE AND THAT I HAVE PROVIDED THIS INFORMATION IN ORDER TO OBTAIN THE BENEFITS OF [place]).  I " HEREBY AUTHORIZE ANY PHYSICIAN, CHIROPRACTOR, SURGEON, PRACTITIONER, OR OTHER PERSON, ANY HOSPITAL, INCLUDING [facility] OR [facility], ANY MEDICAL SERVICE ORGANIZATION, ANY INSURANCE COMPANY, OR OTHER INSTITUTION OR ORGANIZATION TO RELEASE TO EACH OTHER, ANY MEDICAL OR OTHER INFORMATION, INCLUDING BENEFITS PAID OR PAYABLE, PERTINENT TO THIS INJURY OR DISEASE, EXCEPT INFORMATION RELATIVE TO DIAGNOSIS, TREATMENT AND/OR COUNSELING FOR AIDS, PSYCHOLOGICAL CONDITIONS, ALCOHOL OR CONTROLLED SUBSTANCES, FOR WHICH I MUST GIVE SPECIFIC AUTHORIZATION.  A PHOTOSTAT OF THIS AUTHORIZATION SHALL BE AS VALID AS THE ORIGINAL.  Date  12/13/2020                    Place    [facility]                                     Employee’s Signature   THIS REPORT MUST BE COMPLETED AND MAILED WITHIN 3 WORKING DAYS OF TREATMENT   Place [facility], EMERGENCY DEPT                       Name of Facility [facility]   Date  12/13/2020 Diagnosis  (Z77.21,  W46.1XXA) Exposure to body fluids by contaminated hypodermic needle stick Is there evidence the injured employee was under the influence of alcohol and/or another controlled substance at the time of accident?   Hour  4:04 PM Description of Injury or Disease  Exposure to body fluids by contaminated hypodermic needle stick No   Treatment  Needle stick panel  Have you advised the patient to remain off work five days or more?         No   X-Ray Findings    Comments:na If Yes   From Date    To Date      From information given by the employee, together with medical evidence, can you directly connect this injury or occupational disease as job incurred? Yes If No, is employee capable of: Full Duty  Yes Modified Duty  No   Is additional medical care by a physician indicated? Yes  Comments:occupational health If Modified Duty, Specify any Limitations / Restrictions       Do you know of any previous injury or disease contributing to this condition or  "occupational disease? No    Date 12/13/2020 Print Doctor’s Name Geovany Birmingham I certify the employer’s copy of this form was mailed on:   Address [address] [place] [address]  [place] [address] [phone] INSURER’S USE ONLY   Provider’s Tax ID Number   Telephone Dept: [phone]    Doctor’s Signature e-GEOVANY Connelly M.D., MD      Form C-4 (rev.10/07)                                                                         BRIEF DESCRIPTION OF RIGHTS AND BENEFITS  (Pursuant to [place] 616[place].050)    Notice of Injury or Occupational Disease (Incident Report Form C-1): If an injury or occupational disease (OD) arises out of and in the course of employment, you must provide written notice to your employer as soon as practicable, but no later than 7 days after the accident or OD. Your employer shall maintain a sufficient supply of the required forms.    Claim for Compensation (Form C-4): If medical treatment is sought, the form C-4 is available at the place of initial treatment. A completed \"Claim for Compensation\" (Form C-4) must be filed within 90 days after an accident or OD. The treating physician or chiropractor must, within 3 working days after treatment, complete and mail to the employer, the employer's insurer and third-party , the Claim for Compensation.    Medical Treatment: If you require medical treatment for your on-the-job injury or OD, you may be required to select a physician or chiropractor from a list provided by your workers’ compensation insurer, if it has contracted with an Organization for Managed Care (MCO) or Preferred Provider Organization (PPO) or providers of health care. If your employer has not entered into a contract with an MCO or PPO, you may select a physician or chiropractor from the Panel of Physicians and Chiropractors. Any medical costs related to your industrial injury or OD will be paid by your insurer.    Temporary Total Disability (TTD): If your doctor has certified " that you are unable to work for a period of at least 5 consecutive days, or 5 cumulative days in a 20-day period, or places restrictions on you that your employer does not accommodate, you may be entitled to TTD compensation.    Temporary Partial Disability (TPD): If the wage you receive upon reemployment is less than the compensation for TTD to which you are entitled, the insurer may be required to pay you TPD compensation to make up the difference. TPD can only be paid for a maximum of 24 months.    Permanent Partial Disability (PPD): When your medical condition is stable and there is an indication of a PPD as a result of your injury or OD, within 30 days, your insurer must arrange for an evaluation by a rating physician or chiropractor to determine the degree of your PPD. The amount of your PPD award depends on the date of injury, the results of the PPD evaluation, your age and wage.    Permanent Total Disability (PTD): If you are medically certified by a treating physician or chiropractor as permanently and totally disabled and have been granted a PTD status by your insurer, you are entitled to receive monthly benefits not to exceed 66 2/3% of your average monthly wage. The amount of your PTD payments is subject to reduction if you previously received a lump-sum PPD award.    Vocational Rehabilitation Services: You may be eligible for vocational rehabilitation services if you are unable to return to the job due to a permanent physical impairment or permanent restrictions as a result of your injury or occupational disease.    Transportation and Per Rahul Reimbursement: You may be eligible for travel expenses and per rahul associated with medical treatment.    Reopening: You may be able to reopen your claim if your condition worsens after claim closure.     Appeal Process: If you disagree with a written determination issued by the insurer or the insurer does not respond to your request, you may appeal to the  Department of Administration, , by following the instructions contained in your determination letter. You must appeal the determination within 70 days from the date of the determination letter at [address], [address], [place], [place] [address], or [address], [address], [place], [place] [address]. If you disagree with the  decision, you may appeal to the Department of Administration, . You must file your appeal within 30 days from the date of the  decision letter at [address], [address], [place], [place] [address], or [address], [address], [place], [place] [address]. If you disagree with a decision of an , you may file a petition for judicial review with the District Court. You must do so within 30 days of the Appeal Officer’s decision. You may be represented by an  at your own expense or you may contact the [facility] for possible representation.    [place]  for Injured Workers (NAIW): If you disagree with a  decision, you may request that NAIW represent you without charge at an  Hearing. For information regarding denial of benefits, you may contact the [facility] at: [address], [address], [place] [address], [phone], or [address], [address], [place] [address], [phone]    To File a Complaint with the Division: If you wish to file a complaint with the  of the Division of Industrial Relations (DIR),  please contact the Workers’ Compensation Section, [address], [address], [place], [place] [address], telephone [phone]) [phone], or [address], [address], [place], [place] [address], telephone [phone].    For assistance with Workers’ Compensation Issues: You may contact the [facility] Office for Consumer Health Assistance, [address], [address], [place], [place] [address], Toll Free  7-717-545-0770, Web site: http://[facility].[place].gov/Programs/ELLEN E-mail: ellen@[facility].[place].gov  D-2 (rev. 10/20)              __________________________________________________________________                                    _________________            Employee Name / Signature                                                                                                                            Date

## 2020-12-16 ENCOUNTER — OCCUPATIONAL MEDICINE (OUTPATIENT)
Dept: OCCUPATIONAL MEDICINE | Facility: CLINIC | Age: 40
End: 2020-12-16
Payer: COMMERCIAL

## 2020-12-16 VITALS
OXYGEN SATURATION: 97 % | BODY MASS INDEX: 19.13 KG/M2 | RESPIRATION RATE: 12 BRPM | HEIGHT: 66 IN | TEMPERATURE: 97.9 F | DIASTOLIC BLOOD PRESSURE: 60 MMHG | SYSTOLIC BLOOD PRESSURE: 120 MMHG | HEART RATE: 71 BPM | WEIGHT: 119 LBS

## 2020-12-16 DIAGNOSIS — Z23 NEED FOR VACCINATION: ICD-10-CM

## 2020-12-16 DIAGNOSIS — Z77.21 EXPOSURE TO BLOOD OR BODY FLUID: ICD-10-CM

## 2020-12-16 PROCEDURE — 99212 OFFICE O/P EST SF 10 MIN: CPT | Performed by: NURSE PRACTITIONER

## 2020-12-16 ASSESSMENT — FIBROSIS 4 INDEX: FIB4 SCORE: .673575314054563392

## 2020-12-16 ASSESSMENT — ENCOUNTER SYMPTOMS
NEUROLOGICAL NEGATIVE: 1
MUSCULOSKELETAL NEGATIVE: 1
CARDIOVASCULAR NEGATIVE: 1
PSYCHIATRIC NEGATIVE: 1
CONSTITUTIONAL NEGATIVE: 1
RESPIRATORY NEGATIVE: 1

## 2020-12-16 NOTE — LETTER
[address],   [address] [address]  Phone:  [phone] - Fax:  [phone]   [facility] Progress Report and Disability Certification  Date of Service: 12/16/2020   No Show:  No  Date / Time of Next Visit:   Discharged/MMI   Released to Full duty    Claim Information   Patient Name: Vanessa Mora  Claim Number:     Employer: [employer]  Date of Injury: 12/13/2020     Insurer / TPA: Workers [employer]  ID / SSN:     Occupation: RN  Diagnosis: The encounter diagnosis was Exposure to blood or body fluid.    Medical Information   Related to Industrial Injury? Yes    Subjective Complaints:  DOI 12/13/20: Patient received a needlestick to her right index finger while injecting someone with subcu heparin.  Today remains asymptomatic. She denies pain and signs or symptoms of infection. Baseline labs reviewed with patient. Source labs reviewed found to be negative for HIV and HepC. Due to the low risk of exposure HIV prophylaxis was not recommended. Discussed risks and benefits of prophylaxis. Patient agreeable to not taking HIV prophylaxsis at this time. Patient will be released from care at this time.    Objective Findings: Right index finger: No obvious deformities and discolorations. Neg edema, erythema, warmth, or foul discharge. Skin is clean, dry, and intact. Brisk cap refill less than 2 secs.    Pre-Existing Condition(s):     Assessment:   Condition Improved    Status: Discharged /  MMI  Permanent Disability:No    Plan:      Diagnostics:      Comments:  Discharged/MMI   Released to Full duty   Follow-up if needed     Disability Information   Status: Released to Full Duty    From:  12/16/2020  Through:   Restrictions are:     Physical Restrictions   Sitting:    Standing:    Stooping:    Bending:      Squatting:    Walking:    Climbing:    Pushing:      Pulling:    Other:    Reaching Above Shoulder (L):   Reaching Above Shoulder (R):      Reaching Below Shoulder (L):    Reaching Below Shoulder (R):      Not to exceed Weight Limits   Carrying(hrs):   Weight Limit(lb):   Lifting(hrs):   Weight  Limit(lb):     Comments:      Repetitive Actions   Hands: i.e. Fine Manipulations from Grasping:     Feet: i.e. Operating Foot Controls:     Driving / Operate Machinery:     Provider Name:   EDWARDO Woodson Physician Signature:  Physician Name:     Clinic Name / Location: [address],   [address] [place] [id number] Clinic Phone Number: Dept: [phone]   Appointment Time: 2:45 Pm Visit Start Time:    Check-In Time:  2:22 Pm Visit Discharge Time:  3:02pm   Original-Treating Physician or Chiropractor    Page 2-Insurer/TPA    Page 3-Employer    Page 4-Employee

## 2020-12-16 NOTE — PROGRESS NOTES
Subjective:      Vanessa Mora is a 40 y.o. female who presents with No chief complaint on file.      DOI 12/13/20: Patient received a needlestick to her right index finger while injecting someone with subcu heparin.  Today remains asymptomatic. She denies pain and signs or symptoms of infection. Baseline labs reviewed with patient. Source labs reviewed found to be negative for HIV and HepC. Due to the low risk of exposure HIV prophylaxis was not recommended. Discussed risks and benefits of prophylaxis. Patient agreeable to not taking HIV prophylaxsis at this time. Patient will be released from care at this time.      HPI    Review of Systems   Constitutional: Negative.    Respiratory: Negative.    Cardiovascular: Negative.    Musculoskeletal: Negative.    Skin: Negative.    Neurological: Negative.    Psychiatric/Behavioral: Negative.         ROS: All systems were reviewed on intake form, form was reviewed and signed. See scanned documents in media. Pertinent positives and negatives included in HPI.    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     Physical Exam  Constitutional:       Appearance: Normal appearance.   Cardiovascular:      Rate and Rhythm: Normal rate.      Pulses: Normal pulses.   Pulmonary:      Effort: Pulmonary effort is normal.   Musculoskeletal: Normal range of motion.         General: No swelling, tenderness or signs of injury.   Skin:     General: Skin is warm and dry.      Capillary Refill: Capillary refill takes less than 2 seconds.      Findings: No bruising or erythema.   Neurological:      General: No focal deficit present.      Mental Status: She is alert and oriented to person, place, and time.      Sensory: No sensory deficit.      Motor: No weakness.      Gait:  Gait normal.   Psychiatric:         Mood and Affect: Mood normal.         Right index finger: No obvious deformities and discolorations. Neg edema, erythema, warmth, or foul discharge. Skin is clean, dry, and intact. Brisk cap refill less than 2 secs.        Assessment/Plan:        1. Exposure to blood or body fluid    Discharged/MMI   Released to Full Duty   Follow-up if needed

## 2021-01-15 ENCOUNTER — IMMUNIZATION (OUTPATIENT)
Dept: FAMILY PLANNING/WOMEN'S HEALTH CLINIC | Facility: IMMUNIZATION CENTER | Age: 41
End: 2021-01-15
Attending: FAMILY MEDICINE
Payer: COMMERCIAL

## 2021-01-15 DIAGNOSIS — Z23 ENCOUNTER FOR VACCINATION: Primary | ICD-10-CM

## 2021-01-15 DIAGNOSIS — Z23 NEED FOR VACCINATION: ICD-10-CM

## 2021-01-15 PROCEDURE — 91300 PFIZER SARS-COV-2 VACCINE: CPT

## 2021-01-15 PROCEDURE — 0001A PFIZER SARS-COV-2 VACCINE: CPT

## 2021-02-05 ENCOUNTER — IMMUNIZATION (OUTPATIENT)
Dept: FAMILY PLANNING/WOMEN'S HEALTH CLINIC | Facility: IMMUNIZATION CENTER | Age: 41
End: 2021-02-05
Attending: INTERNAL MEDICINE
Payer: COMMERCIAL

## 2021-02-05 DIAGNOSIS — Z23 ENCOUNTER FOR VACCINATION: Primary | ICD-10-CM

## 2021-02-05 PROCEDURE — 0002A PFIZER SARS-COV-2 VACCINE: CPT

## 2021-02-05 PROCEDURE — 91300 PFIZER SARS-COV-2 VACCINE: CPT

## 2021-08-10 ENCOUNTER — OFFICE VISIT (OUTPATIENT)
Dept: MEDICAL GROUP | Facility: MEDICAL CENTER | Age: 41
End: 2021-08-10
Payer: COMMERCIAL

## 2021-08-10 VITALS
WEIGHT: 119.4 LBS | HEIGHT: 66 IN | BODY MASS INDEX: 19.19 KG/M2 | SYSTOLIC BLOOD PRESSURE: 90 MMHG | TEMPERATURE: 97.8 F | HEART RATE: 78 BPM | DIASTOLIC BLOOD PRESSURE: 54 MMHG | OXYGEN SATURATION: 99 %

## 2021-08-10 DIAGNOSIS — Z00.00 WELLNESS EXAMINATION: ICD-10-CM

## 2021-08-10 DIAGNOSIS — F43.9 STRESS: ICD-10-CM

## 2021-08-10 DIAGNOSIS — R06.02 SHORTNESS OF BREATH: ICD-10-CM

## 2021-08-10 DIAGNOSIS — Z13.6 SCREENING FOR CARDIOVASCULAR CONDITION: ICD-10-CM

## 2021-08-10 PROCEDURE — 99395 PREV VISIT EST AGE 18-39: CPT | Performed by: PHYSICIAN ASSISTANT

## 2021-08-10 RX ORDER — ALBUTEROL SULFATE 90 UG/1
2 AEROSOL, METERED RESPIRATORY (INHALATION) EVERY 4 HOURS PRN
Qty: 1 EACH | Refills: 1 | Status: SHIPPED | OUTPATIENT
Start: 2021-08-10 | End: 2022-01-14

## 2021-08-10 ASSESSMENT — FIBROSIS 4 INDEX: FIB4 SCORE: 0.69

## 2021-08-10 ASSESSMENT — PATIENT HEALTH QUESTIONNAIRE - PHQ9: CLINICAL INTERPRETATION OF PHQ2 SCORE: 0

## 2021-08-11 NOTE — PROGRESS NOTES
"Subjective:   Vanessa Mora is a 41 y.o. female here today for annual wellness physical. RN at [employer].     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 1 Each 1     No current facility-administered medications for this visit.     She  has no past medical history on file.    ROS   No fever/chills. No weight change. No headache/dizziness. No focal weakness. No sore throat, nasal congestion, ear pain. No chest pain. No n/v/d/c or abdominal pain. No urinary complaint. No rash or skin lesion. No joint pain or swelling.     Objective:     BP (!) 90/54 (BP Location: Left arm, Patient Position: Sitting, BP Cuff Size: Adult)   Pulse 78   Temp 36.6 °C (97.8 °F) (Temporal)   Ht 1.676 m (5' 6\")   Wt 54.2 kg (119 lb 6.4 oz)   SpO2 99%  Body mass index is 19.27 kg/m².   Physical Exam:  Constitutional: WDWN, NAD  Skin: Warm, dry, good turgor, no rashes in visible areas.  Respiratory: Unlabored respiratory effort, lungs clear to auscultation, no wheezes, no ronchi.  Cardiovascular: Normal S1, S2, no murmur, no leg edema.  Psych: Alert and oriented x3, normal affect and mood.    Assessment and Plan:   The following treatment plan was discussed    1. Wellness examination    - CBC WITH DIFFERENTIAL; Future  - Comp Metabolic Panel; Future  - TSH WITH REFLEX TO FT4; Future    2. Screening for cardiovascular condition    - Lipid Profile; Future    3. Shortness of breath    - albuterol 108 (90 Base) MCG/ACT Aero Soln inhalation aerosol; Inhale 2 Puffs every four hours as needed for Shortness of Breath.  Dispense: 1 Each; Refill: 1    4. Stress        Followup: after labs         "

## 2021-08-11 NOTE — ASSESSMENT & PLAN NOTE
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

## 2021-09-28 ENCOUNTER — PATIENT MESSAGE (OUTPATIENT)
Dept: MEDICAL GROUP | Facility: MEDICAL CENTER | Age: 41
End: 2021-09-28

## 2021-09-28 DIAGNOSIS — R06.02 SOB (SHORTNESS OF BREATH): ICD-10-CM

## 2021-09-28 DIAGNOSIS — Z12.31 ENCOUNTER FOR SCREENING MAMMOGRAM FOR MALIGNANT NEOPLASM OF BREAST: ICD-10-CM

## 2021-10-11 ENCOUNTER — HOSPITAL ENCOUNTER (OUTPATIENT)
Dept: LAB | Facility: MEDICAL CENTER | Age: 41
End: 2021-10-11
Attending: PHYSICIAN ASSISTANT
Payer: COMMERCIAL

## 2021-10-11 DIAGNOSIS — Z00.00 WELLNESS EXAMINATION: ICD-10-CM

## 2021-10-11 DIAGNOSIS — Z13.6 SCREENING FOR CARDIOVASCULAR CONDITION: ICD-10-CM

## 2021-10-11 LAB
ALBUMIN SERPL BCP-MCNC: 4.7 G/DL (ref 3.2–4.9)
ALBUMIN/GLOB SERPL: 2 G/DL
ALP SERPL-CCNC: 43 U/L (ref 30–99)
ALT SERPL-CCNC: 8 U/L (ref 2–50)
ANION GAP SERPL CALC-SCNC: 11 MMOL/L (ref 7–16)
AST SERPL-CCNC: 13 U/L (ref 12–45)
BASOPHILS # BLD AUTO: 1.3 % (ref 0–1.8)
BASOPHILS # BLD: 0.06 K/UL (ref 0–0.12)
BILIRUB SERPL-MCNC: 0.5 MG/DL (ref 0.1–1.5)
BUN SERPL-MCNC: 10 MG/DL (ref 8–22)
CALCIUM SERPL-MCNC: 9.2 MG/DL (ref 8.5–10.5)
CHLORIDE SERPL-SCNC: 106 MMOL/L (ref 96–112)
CHOLEST SERPL-MCNC: 172 MG/DL (ref 100–199)
CO2 SERPL-SCNC: 23 MMOL/L (ref 20–33)
CREAT SERPL-MCNC: 0.7 MG/DL (ref 0.5–1.4)
EOSINOPHIL # BLD AUTO: 0.12 K/UL (ref 0–0.51)
EOSINOPHIL NFR BLD: 2.6 % (ref 0–6.9)
ERYTHROCYTE [DISTWIDTH] IN BLOOD BY AUTOMATED COUNT: 40.7 FL (ref 35.9–50)
FASTING STATUS PATIENT QL REPORTED: NORMAL
GLOBULIN SER CALC-MCNC: 2.4 G/DL (ref 1.9–3.5)
GLUCOSE SERPL-MCNC: 86 MG/DL (ref 65–99)
HCT VFR BLD AUTO: 39.3 % (ref 37–47)
HDLC SERPL-MCNC: 63 MG/DL
HGB BLD-MCNC: 13.4 G/DL (ref 12–16)
IMM GRANULOCYTES # BLD AUTO: 0.01 K/UL (ref 0–0.11)
IMM GRANULOCYTES NFR BLD AUTO: 0.2 % (ref 0–0.9)
LDLC SERPL CALC-MCNC: 95 MG/DL
LYMPHOCYTES # BLD AUTO: 1.51 K/UL (ref 1–4.8)
LYMPHOCYTES NFR BLD: 32.5 % (ref 22–41)
MCH RBC QN AUTO: 31.5 PG (ref 27–33)
MCHC RBC AUTO-ENTMCNC: 34.1 G/DL (ref 33.6–35)
MCV RBC AUTO: 92.3 FL (ref 81.4–97.8)
MONOCYTES # BLD AUTO: 0.31 K/UL (ref 0–0.85)
MONOCYTES NFR BLD AUTO: 6.7 % (ref 0–13.4)
NEUTROPHILS # BLD AUTO: 2.63 K/UL (ref 2–7.15)
NEUTROPHILS NFR BLD: 56.7 % (ref 44–72)
NRBC # BLD AUTO: 0 K/UL
NRBC BLD-RTO: 0 /100 WBC
PLATELET # BLD AUTO: 236 K/UL (ref 164–446)
PMV BLD AUTO: 10.9 FL (ref 9–12.9)
POTASSIUM SERPL-SCNC: 4.4 MMOL/L (ref 3.6–5.5)
PROT SERPL-MCNC: 7.1 G/DL (ref 6–8.2)
RBC # BLD AUTO: 4.26 M/UL (ref 4.2–5.4)
SODIUM SERPL-SCNC: 140 MMOL/L (ref 135–145)
TRIGL SERPL-MCNC: 70 MG/DL (ref 0–149)
TSH SERPL DL<=0.005 MIU/L-ACNC: 1.7 UIU/ML (ref 0.38–5.33)
WBC # BLD AUTO: 4.6 K/UL (ref 4.8–10.8)

## 2021-10-11 PROCEDURE — 84443 ASSAY THYROID STIM HORMONE: CPT

## 2021-10-11 PROCEDURE — 80053 COMPREHEN METABOLIC PANEL: CPT

## 2021-10-11 PROCEDURE — 36415 COLL VENOUS BLD VENIPUNCTURE: CPT

## 2021-10-11 PROCEDURE — 80061 LIPID PANEL: CPT

## 2021-10-11 PROCEDURE — 85025 COMPLETE CBC W/AUTO DIFF WBC: CPT

## 2021-10-20 ENCOUNTER — HOSPITAL ENCOUNTER (OUTPATIENT)
Dept: RADIOLOGY | Facility: MEDICAL CENTER | Age: 41
End: 2021-10-20
Attending: PHYSICIAN ASSISTANT
Payer: COMMERCIAL

## 2021-10-20 DIAGNOSIS — Z12.31 ENCOUNTER FOR MAMMOGRAM TO ESTABLISH BASELINE MAMMOGRAM: ICD-10-CM

## 2021-10-20 PROCEDURE — 77063 BREAST TOMOSYNTHESIS BI: CPT

## 2021-10-23 ENCOUNTER — HOSPITAL ENCOUNTER (EMERGENCY)
Facility: MEDICAL CENTER | Age: 41
End: 2021-10-23
Attending: EMERGENCY MEDICINE
Payer: COMMERCIAL

## 2021-10-23 ENCOUNTER — NON-PROVIDER VISIT (OUTPATIENT)
Dept: OCCUPATIONAL MEDICINE | Facility: CLINIC | Age: 41
End: 2021-10-23
Payer: COMMERCIAL

## 2021-10-23 VITALS
HEART RATE: 94 BPM | BODY MASS INDEX: 18.83 KG/M2 | WEIGHT: 120 LBS | RESPIRATION RATE: 16 BRPM | HEIGHT: 67 IN | SYSTOLIC BLOOD PRESSURE: 128 MMHG | TEMPERATURE: 97.8 F | DIASTOLIC BLOOD PRESSURE: 75 MMHG | OXYGEN SATURATION: 96 %

## 2021-10-23 DIAGNOSIS — Z02.1 PRE-EMPLOYMENT DRUG SCREENING: ICD-10-CM

## 2021-10-23 DIAGNOSIS — Z77.21 EXPOSURE TO BLOOD OR BODY FLUID: ICD-10-CM

## 2021-10-23 DIAGNOSIS — Z02.83 ENCOUNTER FOR DRUG SCREENING: ICD-10-CM

## 2021-10-23 LAB
AMP AMPHETAMINE: NORMAL
BAR BARBITURATES: NORMAL
BREATH ALCOHOL COMMENT: NORMAL
BZO BENZODIAZEPINES: NORMAL
COC COCAINE: NORMAL
HBV CORE AB SERPL QL IA: NONREACTIVE
HBV SURFACE AB SERPL IA-ACNC: 134 MIU/ML (ref 0–10)
HBV SURFACE AG SER QL: ABNORMAL
HCV AB SER QL: ABNORMAL
HIV 1+2 AB+HIV1 P24 AG SERPL QL IA: ABNORMAL
INT CON NEG: NORMAL
INT CON POS: NORMAL
MDMA ECSTASY: NORMAL
MET METHAMPHETAMINES: NORMAL
MTD METHADONE: NORMAL
OPI OPIATES: NORMAL
OXY OXYCODONE: NORMAL
PCP PHENCYCLIDINE: NORMAL
POC BREATHALIZER: 0 PERCENT (ref 0–0.01)
POC URINE DRUG SCREEN OCDRS: NORMAL
THC: NORMAL

## 2021-10-23 PROCEDURE — 86706 HEP B SURFACE ANTIBODY: CPT

## 2021-10-23 PROCEDURE — 36415 COLL VENOUS BLD VENIPUNCTURE: CPT

## 2021-10-23 PROCEDURE — 86803 HEPATITIS C AB TEST: CPT

## 2021-10-23 PROCEDURE — 99283 EMERGENCY DEPT VISIT LOW MDM: CPT

## 2021-10-23 PROCEDURE — 87340 HEPATITIS B SURFACE AG IA: CPT

## 2021-10-23 PROCEDURE — 86704 HEP B CORE ANTIBODY TOTAL: CPT

## 2021-10-23 PROCEDURE — 87389 HIV-1 AG W/HIV-1&-2 AB AG IA: CPT

## 2021-10-23 PROCEDURE — 99026 IN-HOSPITAL ON CALL SERVICE: CPT | Performed by: PREVENTIVE MEDICINE

## 2021-10-23 PROCEDURE — 80305 DRUG TEST PRSMV DIR OPT OBS: CPT | Performed by: PREVENTIVE MEDICINE

## 2021-10-23 PROCEDURE — 82075 ASSAY OF BREATH ETHANOL: CPT | Performed by: PREVENTIVE MEDICINE

## 2021-10-23 ASSESSMENT — FIBROSIS 4 INDEX: FIB4 SCORE: 0.76

## 2021-10-23 NOTE — ED TRIAGE NOTES
Chief Complaint   Patient presents with   • Body Fluid Exposure     Patient was flushing an IR drain at work, and drainage from drain spashed into patient's eyes. Denies visual changes. Patient presents from work.

## 2021-10-23 NOTE — ED PROVIDER NOTES
ED Provider Note    CHIEF COMPLAINT  Chief Complaint   Patient presents with   • Body Fluid Exposure     Patient was flushing an IR drain at work, and drainage from drain spashed into patient's eyes. Denies visual changes. Patient presents from work.        HPI  Vanessa Mora is a 41 y.o. 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 The patient was caring for a pleural drain which had been placed in interventional radiology and was splashed in the face and some of the fluid got in her eyes. The patient did rinse her face and eyes immediately. She was not wearing contact lenses or glasses. She is otherwise uninjured. The patient reports that the source patient has tested negative for HIV and hepatitis panel is negative. The patient has no personal history of HIV or hepatitis and her hepatitis B vaccination is up-to-date. The patient's tetanus booster was updated last year.    REVIEW OF SYSTEMS no other injury or mechanism    PAST MEDICAL HISTORY  No past medical history on file.    FAMILY HISTORY  Family History   Problem Relation Age of Onset   • No Known Problems Mother    • No Known Problems Father    • No Known Problems Sister    • Cancer Paternal Grandmother 48        Breast   • No Known Problems Sister    • Other Sister         MS       SOCIAL HISTORY  Social History     Socioeconomic History   • Marital status:      Spouse name: Not on file   • Number of children: Not on file   • Years of education: Not on file   • Highest education level: Not on file   Occupational History   • Not on file   Tobacco Use   • Smoking status: Never Smoker   • Smokeless tobacco: Never Used   Vaping Use   • Vaping Use: Never used   Substance and Sexual Activity   • Alcohol use: Yes     Alcohol/week: 3.0 oz     Types: 5 Glasses of wine per week     Comment: occasional   • Drug use: No   • Sexual activity:  "Yes     Partners: Male   Other Topics Concern   • Not on file   Social History Narrative   • Not on file     Social Determinants of Health     Financial Resource Strain:    • Difficulty of Paying Living Expenses:    Food Insecurity:    • Worried About Running Out of Food in the Last Year:    • Ran Out of Food in the Last Year:    Transportation Needs:    • Lack of Transportation (Medical):    • Lack of Transportation (Non-Medical):    Physical Activity:    • Days of Exercise per Week:    • Minutes of Exercise per Session:    Stress:    • Feeling of Stress :    Social Connections:    • Frequency of Communication with Friends and Family:    • Frequency of Social Gatherings with Friends and Family:    • Attends [employer] Services:    • Active Member of Clubs or Organizations:    • Attends Club or Organization Meetings:    • Marital Status:    Intimate Partner Violence:    • Fear of Current or Ex-Partner:    • Emotionally Abused:    • Physically Abused:    • Sexually Abused:        SURGICAL HISTORY  No past surgical history on file.    CURRENT MEDICATIONS  Home Medications     Reviewed by Mame Morales R.N. (Registered Nurse) on 10/23/21 at 1511  Med List Status: Complete   Medication Last Dose Status   albuterol 108 (90 Base) MCG/ACT Aero Soln inhalation aerosol Not taking Active                ALLERGIES  No Known Allergies    PHYSICAL EXAM  VITAL SIGNS: Ht 1.702 m (5' 7\")   Wt 54.4 kg (120 lb)   LMP 10/15/2021   Breastfeeding No   BMI 18.79 kg/m²    Oxygen saturation is interpreted as adequate  Constitutional: Awake lucid verbal very pleasant individual she does not appear toxic or distressed  Eyes: No erythema discharge or evidence of physical injury to the eyes lids and lashes look normal  Neurologic: Awake lucid verbal ambulatory    Laboratory  Laboratory testing was sent to the lab per hospital body fluid exposure protocol, source patient negative    MEDICAL DECISION MAKING and DISPOSITION   At this point in " time I have discussed with the patient that I think the risk of alejandro illness after this exposure is extremely low however the source patient may have had bacterial infections in the pleural fluid and we have discussed whether or not to start antibiotic eyedrops at this point in time. I patient does not have an infection in the eyes now but if she does develop redness or swelling pus discharge or irritation of the eyes she is to return immediately here or to occupational health and may require topical antibiotic eyedrops. The patient is instructed to call the occupational health clinic and arrange office follow-up as soon as possible.        IMPRESSION  1. Body fluid exposure at work         Electronically signed by: Ian Carr M.D., 10/23/2021 3:26 PM

## 2021-10-23 NOTE — DISCHARGE INSTRUCTIONS
If your eyes are red or swollen or have pus or discharge or irritation return immediately here or to occupational health for recheck. Call the occupational health clinic first thing Monday morning and arrange office follow-up and recheck as soon as possible

## 2021-10-23 NOTE — LETTER
"  FORM C-4:  EMPLOYEE’S CLAIM FOR COMPENSATION/ REPORT OF INITIAL TREATMENT  EMPLOYEE’S CLAIM - PROVIDE ALL INFORMATION REQUESTED   First Name Vanessa Last Name Morgan Birthdate 1980  Sex female Claim Number   Home Address [address]   [facility]             Zip [address]                                   Age  41 y.o. Height  1.702 m (5' 7\") Weight  54.4 kg (120 lb) [facility]     Mailing Address [address]  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 (if applicable) Pt. Care    How did this injury or occupational disease occur? Be specific and answer in detail. Use additional sheet if necessary)  Flushing IR drain, purulent draining splashed across face and in my eyes.   If you believe that you have an occupational disease, when did you first have knowledge of the disability and it relationship to your employment? N/A Witnesses to the Accident  Pt.   Nature of Injury or Occupational Disease  Workers' Compensation Part(s) of Body Injured or Affected  Eye (L), Eye (R), N/A    I CERTIFY THAT THE ABOVE IS TRUE AND CORRECT TO THE BEST OF MY KNOWLEDGE AND THAT I HAVE PROVIDED THIS INFORMATION IN ORDER TO OBTAIN THE BENEFITS OF [place]).  I HEREBY AUTHORIZE ANY " PHYSICIAN, CHIROPRACTOR, SURGEON, PRACTITIONER, OR OTHER PERSON, ANY HOSPITAL, INCLUDING [facility], ANY MEDICAL SERVICE ORGANIZATION, ANY INSURANCE COMPANY, OR OTHER INSTITUTION OR ORGANIZATION TO RELEASE TO EACH OTHER, ANY MEDICAL OR OTHER INFORMATION, INCLUDING BENEFITS PAID OR PAYABLE, PERTINENT TO THIS INJURY OR DISEASE, EXCEPT INFORMATION RELATIVE TO DIAGNOSIS, TREATMENT AND/OR COUNSELING FOR AIDS, PSYCHOLOGICAL CONDITIONS, ALCOHOL OR CONTROLLED SUBSTANCES, FOR WHICH I MUST GIVE SPECIFIC AUTHORIZATION.  A PHOTOSTAT OF THIS AUTHORIZATION SHALL BE AS VALID AS THE ORIGINAL.  Date  10/23/2021        [facility]                              Employee’s Signature   THIS REPORT MUST BE COMPLETED AND MAILED WITHIN 3 WORKING DAYS OF TREATMENT   Place [facility], EMERGENCY DEPT                       Name of Facility [facility]   Date  10/23/2021 Diagnosis  (Z77.21) Exposure to blood or body fluid Is there evidence the injured employee was under the influence of alcohol and/or another controlled substance at the time of accident?   Hour  3:32 PM Description of Injury or Disease  Exposure to blood or body fluid No   Treatment  Patient was at work and her eyes were splashed by body fluid from a pleural drain from a patient. The source patient has reportedly tested negative for HIV and hepatitis panel negative. Patient instructed to follow-up with occupational health Monday and to return here or to occupational health if she has any irritation redness or discharge from the eyes. Protocol lab work for body fluid exposure was sent to the laboratory today  Have you advised the patient to remain off work five days or more?         No   X-Ray Findings    Comments:None indicated If Yes   From Date    To Date      From information given by the employee, together with medical evidence, can you directly connect this injury or  "occupational disease as job incurred? Yes If No, is employee capable of: Full Duty  Yes Modified Duty      Is additional medical care by a physician indicated? Yes  Comments:Occupational health follow-up If Modified Duty, Specify any Limitations / Restrictions       Do you know of any previous injury or disease contributing to this condition or occupational disease? No    Date 10/23/2021 Print Doctor’s Name Ian Carr certify the employer’s copy of this form was mailed on:   Address [address] [place] [id number] [phone] INSURER’S USE ONLY   Provider’s Tax ID Number   Telephone Dept: [phone]    Doctor’s Signature e-IAN Ricketts M.D. Degree  M.D.      Form C-4 (rev.10/07)                                                                         BRIEF DESCRIPTION OF RIGHTS AND BENEFITS  (Pursuant to [place] 616C.050)    Notice of Injury or Occupational Disease (Incident Report Form C-1): If an injury or occupational disease (OD) arises out of and in the course of employment, you must provide written notice to your employer as soon as practicable, but no later than 7 days after the accident or OD. Your employer shall maintain a sufficient supply of the required forms.    Claim for Compensation (Form C-4): If medical treatment is sought, the form C-4 is available at the place of initial treatment. A completed \"Claim for Compensation\" (Form C-4) must be filed within 90 days after an accident or OD. The treating physician or chiropractor must, within 3 working days after treatment, complete and mail to the employer, the employer's insurer and third-party , the Claim for Compensation.    Medical Treatment: If you require medical treatment for your on-the-job injury or OD, you may be required to select a physician or chiropractor from a list provided by your workers’ compensation insurer, if it has contracted with an Organization for Managed Care (MCO) or Preferred Provider Organization " (PPO) or providers of health care. If your employer has not entered into a contract with an MCO or PPO, you may select a physician or chiropractor from the Panel of Physicians and Chiropractors. Any medical costs related to your industrial injury or OD will be paid by your insurer.    Temporary Total Disability (TTD): If your doctor has certified that you are unable to work for a period of at least 5 consecutive days, or 5 cumulative days in a 20-day period, or places restrictions on you that your employer does not accommodate, you may be entitled to TTD compensation.    Temporary Partial Disability (TPD): If the wage you receive upon reemployment is less than the compensation for TTD to which you are entitled, the insurer may be required to pay you TPD compensation to make up the difference. TPD can only be paid for a maximum of 24 months.    Permanent Partial Disability (PPD): When your medical condition is stable and there is an indication of a PPD as a result of your injury or OD, within 30 days, your insurer must arrange for an evaluation by a rating physician or chiropractor to determine the degree of your PPD. The amount of your PPD award depends on the date of injury, the results of the PPD evaluation, your age and wage.    Permanent Total Disability (PTD): If you are medically certified by a treating physician or chiropractor as permanently and totally disabled and have been granted a PTD status by your insurer, you are entitled to receive monthly benefits not to exceed 66 2/3% of your average monthly wage. The amount of your PTD payments is subject to reduction if you previously received a lump-sum PPD award.    Vocational Rehabilitation Services: You may be eligible for vocational rehabilitation services if you are unable to return to the job due to a permanent physical impairment or permanent restrictions as a result of your injury or occupational disease.    Transportation and Per Molly Reimbursement:  You may be eligible for travel expenses and per rahul associated with medical treatment.    Reopening: You may be able to reopen your claim if your condition worsens after claim closure.     Appeal Process: If you disagree with a written determination issued by the insurer or the insurer does not respond to your request, you may appeal to the Department of Administration, , by following the instructions contained in your determination letter. You must appeal the determination within 70 days from the date of the determination letter at [address], [address], [place], [place] [address], or [address], [place], [place] [address]. If you disagree with the  decision, you may appeal to the Department of Administration, . You must file your appeal within 30 days from the date of the  decision letter at [address], [address], [place], [place] [address], or [address], [facility] [address], [place], [place] [address]. If you disagree with a decision of an , you may file a petition for judicial review with the District Court. You must do so within 30 days of the Appeal Officer’s decision. You may be represented by an  at your own expense or you may contact the [facility] for possible representation.    [place]  for Injured Workers (NAIW): If you disagree with a  decision, you may request that NAIW represent you without charge at an  Hearing. For information regarding denial of benefits, you may contact the [facility] at: [address], [address], [place] [address], [phone], or [address], [facility] [address], [place] [address], [phone]    To File a Complaint with the Division: If you wish to file a complaint with the  of the Division of Industrial Relations (DIR),  please contact the Workers’ Compensation Section, [address], [place], [place] [address], telephone ([phone] [phone], or [address], [place], [place] [address], telephone [phone].    For assistance with Workers’ Compensation Issues: You may contact the [facility] Office for Consumer Health Assistance, [address], [address], [place], [place] [address], Toll Free 1-208.925.7504, Web site: http://[facility].[place].gov/Programs/ELLEN E-mail: ellen@[facility].nv.gov  D-2 (rev. 10/20)              __________________________________________________________________                                    __10/23/2021_____            Employee Name / Signature                                                                                                                            Date

## 2022-01-13 ENCOUNTER — TELEMEDICINE (OUTPATIENT)
Dept: MEDICAL GROUP | Facility: MEDICAL CENTER | Age: 42
End: 2022-01-13
Payer: COMMERCIAL

## 2022-01-13 VITALS — WEIGHT: 125 LBS | HEIGHT: 66 IN | HEART RATE: 71 BPM | BODY MASS INDEX: 20.09 KG/M2

## 2022-01-13 DIAGNOSIS — F41.9 ANXIETY: ICD-10-CM

## 2022-01-13 PROCEDURE — 99213 OFFICE O/P EST LOW 20 MIN: CPT | Mod: 95 | Performed by: PHYSICIAN ASSISTANT

## 2022-01-13 RX ORDER — HYDROXYZINE HYDROCHLORIDE 10 MG/1
TABLET, FILM COATED ORAL
COMMUNITY
Start: 2021-11-24 | End: 2022-01-13

## 2022-01-13 RX ORDER — BUSPIRONE HYDROCHLORIDE 5 MG/1
5 TABLET ORAL 3 TIMES DAILY
Qty: 90 TABLET | Refills: 0 | Status: SHIPPED | OUTPATIENT
Start: 2022-01-13 | End: 2022-02-09

## 2022-01-13 ASSESSMENT — ANXIETY QUESTIONNAIRES
3. WORRYING TOO MUCH ABOUT DIFFERENT THINGS: SEVERAL DAYS
7. FEELING AFRAID AS IF SOMETHING AWFUL MIGHT HAPPEN: SEVERAL DAYS
4. TROUBLE RELAXING: NOT AT ALL
6. BECOMING EASILY ANNOYED OR IRRITABLE: NOT AT ALL
GAD7 TOTAL SCORE: 2
2. NOT BEING ABLE TO STOP OR CONTROL WORRYING: NOT AT ALL
1. FEELING NERVOUS, ANXIOUS, OR ON EDGE: NOT AT ALL
IF YOU CHECKED OFF ANY PROBLEMS ON THIS QUESTIONNAIRE, HOW DIFFICULT HAVE THESE PROBLEMS MADE IT FOR YOU TO DO YOUR WORK, TAKE CARE OF THINGS AT HOME, OR GET ALONG WITH OTHER PEOPLE: NOT DIFFICULT AT ALL
5. BEING SO RESTLESS THAT IT IS HARD TO SIT STILL: NOT AT ALL

## 2022-01-13 ASSESSMENT — FIBROSIS 4 INDEX: FIB4 SCORE: 0.76

## 2022-01-13 ASSESSMENT — PATIENT HEALTH QUESTIONNAIRE - PHQ9: CLINICAL INTERPRETATION OF PHQ2 SCORE: 0

## 2022-01-14 PROBLEM — F41.9 ANXIETY: Status: ACTIVE | Noted: 2022-01-14

## 2022-01-14 NOTE — PROGRESS NOTES
Virtual Visit: Established Patient   This visit was conducted via Zoom using secure and encrypted videoconferencing technology.   The patient was in a private location in the state of [place].    The patient's identity was confirmed and verbal consent was obtained for this virtual visit.    Subjective:   CC:   Chief Complaint   Patient presents with   • Anxiety     Discuss med options     Vanessa Mora is a 41 y.o. female presenting for evaluation and management of:    Anxiety  Chronic, off and on, sometimes mild, currently a little worse, would like to consider medication which she has not been on in the past    Note from patient:  Rylie Tolentino,   Last appt we discussed meds for stress/anxiety. I was having some success this fall/early winter with therapy and exercise and I was given a script for Hydroxyzine PRN from my therapy APRN. To be honest I haven't really used it aside from a couple of times with no real noticeable benefit.     I am reaching out now because I am noticing my anxiety becoming more problematic in my daily life again; disrupting my sleep, making me forgetful, I'm perseverating on certain worries, and I am having a lot more days with an under current of dread and sense of doom. I was hoping to start/try a daily med. We had discussed buspar or a SSRI like lexapro previously. I think I would prefer to try lexapro, but would obviously defer to you.           ROSno fever/chills. No headache/dizziness. No chest pain, SOB or difficulty breathing. No n/v/d/c or abdominal pain. No rash or skin lesion       Current medicines (including changes today)  Current Outpatient Medications   Medication Sig Dispense Refill   • busPIRone (BUSPAR) 5 MG tablet Take 1 Tablet by mouth 3 times a day for 30 days. 90 Tablet 0     No current facility-administered medications for this visit.       Patient Active Problem List    Diagnosis Date Noted   • Anxiety 01/14/2022   • Stress 08/10/2021   • Chronic dryness of  "both eyes 05/01/2019   • Rosacea 05/01/2019   • Acne 05/01/2019   • Fatigue 05/01/2019        Objective:   Pulse 71   Ht 1.676 m (5' 6\")   Wt 56.7 kg (125 lb) Comment: Pt reported  LMP 12/30/2021   BMI 20.18 kg/m²     Physical Exam:  Constitutional: Alert, no distress, well-groomed.  Skin: No rashes in visible areas.  Eye: Round. Conjunctiva clear, lids normal. No icterus.   ENMT: Lips pink without lesions, good dentition, moist mucous membranes. Phonation normal.  Neck: No masses, no thyromegaly. Moves freely without pain.  Respiratory: Unlabored respiratory effort, no cough or audible wheeze  Psych: Alert and oriented x3, normal affect and mood.     Assessment and Plan:   The following treatment plan was discussed:     1. Anxiety  - busPIRone (BUSPAR) 5 MG tablet; Take 1 Tablet by mouth 3 times a day for 30 days.  Dispense: 90 Tablet; Refill: 0      Follow-up: 4 weeks         "

## 2022-01-14 NOTE — ASSESSMENT & PLAN NOTE
Chronic, off and on, sometimes mild, currently a little worse, would like to consider medication which she has not been on in the past    Note from patient:  Rylie Tolentino,   Last appt we discussed meds for stress/anxiety. I was having some success this fall/early winter with therapy and exercise and I was given a script for Hydroxyzine PRN from my therapy APRN. To be honest I haven't really used it aside from a couple of times with no real noticeable benefit.     I am reaching out now because I am noticing my anxiety becoming more problematic in my daily life again; disrupting my sleep, making me forgetful, I'm perseverating on certain worries, and I am having a lot more days with an under current of dread and sense of doom. I was hoping to start/try a daily med. We had discussed buspar or a SSRI like lexapro previously. I think I would prefer to try lexapro, but would obviously defer to you.

## 2022-02-09 DIAGNOSIS — F41.9 ANXIETY: ICD-10-CM

## 2022-02-09 RX ORDER — BUSPIRONE HYDROCHLORIDE 5 MG/1
TABLET ORAL
Qty: 90 TABLET | Refills: 5 | Status: SHIPPED | OUTPATIENT
Start: 2022-02-09 | End: 2022-04-19

## 2022-04-19 ENCOUNTER — TELEMEDICINE (OUTPATIENT)
Dept: MEDICAL GROUP | Facility: MEDICAL CENTER | Age: 42
End: 2022-04-19
Payer: COMMERCIAL

## 2022-04-19 VITALS — HEART RATE: 71 BPM | HEIGHT: 67 IN | WEIGHT: 123 LBS | BODY MASS INDEX: 19.3 KG/M2

## 2022-04-19 DIAGNOSIS — F41.9 ANXIETY: ICD-10-CM

## 2022-04-19 PROCEDURE — 99213 OFFICE O/P EST LOW 20 MIN: CPT | Mod: 95 | Performed by: PHYSICIAN ASSISTANT

## 2022-04-19 RX ORDER — ESCITALOPRAM OXALATE 10 MG/1
10 TABLET ORAL DAILY
Qty: 30 TABLET | Refills: 2 | Status: SHIPPED | OUTPATIENT
Start: 2022-04-19 | End: 2022-05-19

## 2022-04-19 ASSESSMENT — FIBROSIS 4 INDEX: FIB4 SCORE: 0.78

## 2022-04-19 NOTE — PROGRESS NOTES
"Virtual Visit: Established Patient   This visit was conducted via Zoom using secure and encrypted videoconferencing technology.   The patient was in their home in the [facility].    The patient's identity was confirmed and verbal consent was obtained for this virtual visit.    Subjective:   CC:   Chief Complaint   Patient presents with   • Anxiety     Vanessa Mora is a 42 y.o. female presenting for evaluation and management of:    Anxiety  Tried buspirone and couldn't manage the schedule. Did make some life changes that helped. Working nights right now which isn't helping. Doing therapy. Ssri as a teen, nothing recent. Willing to try.      ROS no fever/chills. No headache/dizziness      Current medicines (including changes today)  Current Outpatient Medications   Medication Sig Dispense Refill   • escitalopram (LEXAPRO) 10 MG Tab Take 1 Tablet by mouth every day. 30 Tablet 2     No current facility-administered medications for this visit.       Patient Active Problem List    Diagnosis Date Noted   • Anxiety 01/14/2022   • Stress 08/10/2021   • Chronic dryness of both eyes 05/01/2019   • Rosacea 05/01/2019   • Acne 05/01/2019   • Fatigue 05/01/2019        Objective:   Pulse 71 Comment: Pt reported  Ht 1.702 m (5' 7\") Comment: Pt reported  Wt 55.8 kg (123 lb) Comment: Pt reported  LMP 04/14/2022   BMI 19.26 kg/m²     Physical Exam:  Constitutional: Alert, no distress, well-groomed.  Skin: No rashes in visible areas.  Eye: Round. Conjunctiva clear, lids normal. No icterus.   ENMT: Lips pink without lesions, good dentition, moist mucous membranes. Phonation normal.  Neck: No masses, no thyromegaly. Moves freely without pain.  Respiratory: Unlabored respiratory effort, no cough or audible wheeze  Psych: Alert and oriented x3, normal affect and mood.     Assessment and Plan:   The following treatment plan was discussed:     1. Anxiety  - escitalopram (LEXAPRO) 10 MG Tab; Take 1 Tablet by mouth every day. "  Dispense: 30 Tablet; Refill: 2      Follow-up: will establish with new PCP as I am leaving [facility]

## 2022-04-19 NOTE — ASSESSMENT & PLAN NOTE
Tried buspirone and couldn't manage the schedule. Did make some life changes that helped. Working nights right now which isn't helping. Doing therapy. Ssri as a teen, nothing recent. Willing to try.

## 2022-05-19 DIAGNOSIS — F41.9 ANXIETY: ICD-10-CM

## 2022-05-19 RX ORDER — ESCITALOPRAM OXALATE 10 MG/1
10 TABLET ORAL DAILY
Qty: 30 TABLET | Refills: 2 | Status: SHIPPED | OUTPATIENT
Start: 2022-05-19 | End: 2022-10-17 | Stop reason: SDUPTHER

## 2022-07-25 ENCOUNTER — EH NON-PROVIDER (OUTPATIENT)
Dept: OCCUPATIONAL MEDICINE | Facility: CLINIC | Age: 42
End: 2022-07-25

## 2022-07-25 DIAGNOSIS — Z02.89 ENCOUNTER FOR OCCUPATIONAL HEALTH EXAMINATION INVOLVING RESPIRATOR: ICD-10-CM

## 2022-07-25 PROCEDURE — 94375 RESPIRATORY FLOW VOLUME LOOP: CPT | Performed by: NURSE PRACTITIONER

## 2022-10-17 DIAGNOSIS — F41.9 ANXIETY: ICD-10-CM

## 2022-10-17 RX ORDER — ESCITALOPRAM OXALATE 10 MG/1
10 TABLET ORAL DAILY
Qty: 30 TABLET | Refills: 0 | Status: SHIPPED | OUTPATIENT
Start: 2022-10-17 | End: 2022-12-12 | Stop reason: SDUPTHER

## 2022-10-17 NOTE — TELEPHONE ENCOUNTER
Received request via: Pharmacy    Was the patient seen in the last year in this department? Yes    Does the patient have an active prescription (recently filled or refills available) for medication(s) requested? No    Pt informed to establish care with [facility]

## 2022-12-12 ENCOUNTER — OFFICE VISIT (OUTPATIENT)
Dept: MEDICAL GROUP | Facility: MEDICAL CENTER | Age: 42
End: 2022-12-12
Payer: COMMERCIAL

## 2022-12-12 VITALS
RESPIRATION RATE: 16 BRPM | WEIGHT: 125 LBS | DIASTOLIC BLOOD PRESSURE: 60 MMHG | OXYGEN SATURATION: 99 % | TEMPERATURE: 97.7 F | SYSTOLIC BLOOD PRESSURE: 94 MMHG | HEART RATE: 79 BPM | BODY MASS INDEX: 19.62 KG/M2 | HEIGHT: 67 IN

## 2022-12-12 DIAGNOSIS — F41.9 ANXIETY: ICD-10-CM

## 2022-12-12 DIAGNOSIS — R05.3 CHRONIC COUGH: ICD-10-CM

## 2022-12-12 DIAGNOSIS — Z00.00 ENCOUNTER FOR MEDICAL EXAMINATION TO ESTABLISH CARE: ICD-10-CM

## 2022-12-12 PROBLEM — F43.9 STRESS: Status: RESOLVED | Noted: 2021-08-10 | Resolved: 2022-12-12

## 2022-12-12 PROBLEM — L70.9 ACNE: Status: RESOLVED | Noted: 2019-05-01 | Resolved: 2022-12-12

## 2022-12-12 PROBLEM — L71.9 ROSACEA: Status: RESOLVED | Noted: 2019-05-01 | Resolved: 2022-12-12

## 2022-12-12 PROCEDURE — 99214 OFFICE O/P EST MOD 30 MIN: CPT | Performed by: STUDENT IN AN ORGANIZED HEALTH CARE EDUCATION/TRAINING PROGRAM

## 2022-12-12 RX ORDER — ESCITALOPRAM OXALATE 10 MG/1
10 TABLET ORAL DAILY
Qty: 90 TABLET | Refills: 3 | Status: SHIPPED | OUTPATIENT
Start: 2022-12-12 | End: 2023-06-07

## 2022-12-12 SDOH — HEALTH STABILITY: PHYSICAL HEALTH: ON AVERAGE, HOW MANY MINUTES DO YOU ENGAGE IN EXERCISE AT THIS LEVEL?: 60 MIN

## 2022-12-12 SDOH — ECONOMIC STABILITY: HOUSING INSECURITY
IN THE LAST 12 MONTHS, WAS THERE A TIME WHEN YOU DID NOT HAVE A STEADY PLACE TO SLEEP OR SLEPT IN A SHELTER (INCLUDING NOW)?: NO

## 2022-12-12 SDOH — ECONOMIC STABILITY: FOOD INSECURITY: WITHIN THE PAST 12 MONTHS, YOU WORRIED THAT YOUR FOOD WOULD RUN OUT BEFORE YOU GOT MONEY TO BUY MORE.: NEVER TRUE

## 2022-12-12 SDOH — ECONOMIC STABILITY: HOUSING INSECURITY: IN THE LAST 12 MONTHS, HOW MANY PLACES HAVE YOU LIVED?: 1

## 2022-12-12 SDOH — HEALTH STABILITY: MENTAL HEALTH
STRESS IS WHEN SOMEONE FEELS TENSE, NERVOUS, ANXIOUS, OR CAN'T SLEEP AT NIGHT BECAUSE THEIR MIND IS TROUBLED. HOW STRESSED ARE YOU?: NOT AT ALL

## 2022-12-12 SDOH — ECONOMIC STABILITY: TRANSPORTATION INSECURITY
IN THE PAST 12 MONTHS, HAS LACK OF RELIABLE TRANSPORTATION KEPT YOU FROM MEDICAL APPOINTMENTS, MEETINGS, WORK OR FROM GETTING THINGS NEEDED FOR DAILY LIVING?: NO

## 2022-12-12 SDOH — ECONOMIC STABILITY: FOOD INSECURITY: WITHIN THE PAST 12 MONTHS, THE FOOD YOU BOUGHT JUST DIDN'T LAST AND YOU DIDN'T HAVE MONEY TO GET MORE.: NEVER TRUE

## 2022-12-12 SDOH — HEALTH STABILITY: PHYSICAL HEALTH: ON AVERAGE, HOW MANY DAYS PER WEEK DO YOU ENGAGE IN MODERATE TO STRENUOUS EXERCISE (LIKE A BRISK WALK)?: 5 DAYS

## 2022-12-12 SDOH — ECONOMIC STABILITY: TRANSPORTATION INSECURITY
IN THE PAST 12 MONTHS, HAS THE LACK OF TRANSPORTATION KEPT YOU FROM MEDICAL APPOINTMENTS OR FROM GETTING MEDICATIONS?: NO

## 2022-12-12 SDOH — ECONOMIC STABILITY: INCOME INSECURITY: HOW HARD IS IT FOR YOU TO PAY FOR THE VERY BASICS LIKE FOOD, HOUSING, MEDICAL CARE, AND HEATING?: NOT HARD AT ALL

## 2022-12-12 SDOH — ECONOMIC STABILITY: INCOME INSECURITY: IN THE LAST 12 MONTHS, WAS THERE A TIME WHEN YOU WERE NOT ABLE TO PAY THE MORTGAGE OR RENT ON TIME?: NO

## 2022-12-12 SDOH — ECONOMIC STABILITY: TRANSPORTATION INSECURITY
IN THE PAST 12 MONTHS, HAS LACK OF TRANSPORTATION KEPT YOU FROM MEETINGS, WORK, OR FROM GETTING THINGS NEEDED FOR DAILY LIVING?: NO

## 2022-12-12 ASSESSMENT — SOCIAL DETERMINANTS OF HEALTH (SDOH)
DO YOU BELONG TO ANY CLUBS OR ORGANIZATIONS SUCH AS CHURCH GROUPS UNIONS, FRATERNAL OR ATHLETIC GROUPS, OR SCHOOL GROUPS?: YES
HOW OFTEN DO YOU GET TOGETHER WITH FRIENDS OR RELATIVES?: MORE THAN THREE TIMES A WEEK
HOW OFTEN DO YOU GET TOGETHER WITH FRIENDS OR RELATIVES?: MORE THAN THREE TIMES A WEEK
HOW MANY DRINKS CONTAINING ALCOHOL DO YOU HAVE ON A TYPICAL DAY WHEN YOU ARE DRINKING: 1 OR 2
IN A TYPICAL WEEK, HOW MANY TIMES DO YOU TALK ON THE PHONE WITH FAMILY, FRIENDS, OR NEIGHBORS?: MORE THAN THREE TIMES A WEEK
HOW HARD IS IT FOR YOU TO PAY FOR THE VERY BASICS LIKE FOOD, HOUSING, MEDICAL CARE, AND HEATING?: NOT HARD AT ALL
HOW OFTEN DO YOU ATTEND CHURCH OR RELIGIOUS SERVICES?: NEVER
HOW OFTEN DO YOU HAVE A DRINK CONTAINING ALCOHOL: 2-3 TIMES A WEEK
IN A TYPICAL WEEK, HOW MANY TIMES DO YOU TALK ON THE PHONE WITH FAMILY, FRIENDS, OR NEIGHBORS?: MORE THAN THREE TIMES A WEEK
HOW OFTEN DO YOU ATTENT MEETINGS OF THE CLUB OR ORGANIZATION YOU BELONG TO?: 1 TO 4 TIMES PER YEAR
HOW OFTEN DO YOU ATTENT MEETINGS OF THE CLUB OR ORGANIZATION YOU BELONG TO?: 1 TO 4 TIMES PER YEAR
DO YOU BELONG TO ANY CLUBS OR ORGANIZATIONS SUCH AS CHURCH GROUPS UNIONS, FRATERNAL OR ATHLETIC GROUPS, OR SCHOOL GROUPS?: YES
HOW OFTEN DO YOU HAVE SIX OR MORE DRINKS ON ONE OCCASION: NEVER
HOW OFTEN DO YOU ATTEND CHURCH OR RELIGIOUS SERVICES?: NEVER
WITHIN THE PAST 12 MONTHS, YOU WORRIED THAT YOUR FOOD WOULD RUN OUT BEFORE YOU GOT THE MONEY TO BUY MORE: NEVER TRUE

## 2022-12-12 ASSESSMENT — LIFESTYLE VARIABLES
SKIP TO QUESTIONS 9-10: 1
HOW MANY STANDARD DRINKS CONTAINING ALCOHOL DO YOU HAVE ON A TYPICAL DAY: 1 OR 2
HOW OFTEN DO YOU HAVE SIX OR MORE DRINKS ON ONE OCCASION: NEVER
AUDIT-C TOTAL SCORE: 3
HOW OFTEN DO YOU HAVE A DRINK CONTAINING ALCOHOL: 2-3 TIMES A WEEK

## 2022-12-12 ASSESSMENT — FIBROSIS 4 INDEX: FIB4 SCORE: 0.78

## 2022-12-12 NOTE — PROGRESS NOTES
"Subjective:     CC:  Diagnoses of Encounter for medical examination to establish care, Anxiety, and Chronic cough were pertinent to this visit.    HISTORY OF THE PRESENT ILLNESS: Patient is a 42 y.o. female. This pleasant patient is here today to establish care and discuss the following.    Problem   Chronic Cough    This is a chronic uncontrolled condition.  She notes a chronic cough.  She does not have a history of asthma, and has tried albuterol inhaler to help with her symptoms but did not notice any difference.  She does have allergies and has been tested which has shown that she is severely allergic to sagebrush.  She did take a Claritin at one point but did not feel it made much difference.     Anxiety    Chronic stable, Controlled with lexapro 10mg daily and therapy. She is inconsistent with her lexapro use but in general does take it.      Stress (Resolved)   Rosacea (Resolved)   Acne (Resolved)       Health Maintenance: Completed    ROS:   ROS      Objective:     Exam: BP (!) 94/60 (BP Location: Left arm, Patient Position: Sitting, BP Cuff Size: Adult)   Pulse 79   Temp 36.5 °C (97.7 °F) (Temporal)   Resp 16   Ht 1.702 m (5' 7\")   Wt 56.7 kg (125 lb)   SpO2 99%  Body mass index is 19.58 kg/m².    Physical Exam  Vitals reviewed.   Constitutional:       General: She is not in acute distress.     Appearance: She is normal weight. She is not toxic-appearing.   HENT:      Head: Normocephalic and atraumatic.      Right Ear: External ear normal.      Left Ear: External ear normal.   Eyes:      General:         Right eye: No discharge.         Left eye: No discharge.      Extraocular Movements: Extraocular movements intact.      Conjunctiva/sclera: Conjunctivae normal.   Cardiovascular:      Rate and Rhythm: Normal rate and regular rhythm.      Pulses: Normal pulses.      Heart sounds: Normal heart sounds. No murmur heard.  Pulmonary:      Effort: Pulmonary effort is normal. No respiratory distress.      " Breath sounds: Normal breath sounds.   Abdominal:      General: Abdomen is flat. Bowel sounds are normal. There is no distension.      Palpations: Abdomen is soft.      Tenderness: There is no abdominal tenderness.   Musculoskeletal:         General: Normal range of motion.   Skin:     General: Skin is warm and dry.      Capillary Refill: Capillary refill takes less than 2 seconds.   Neurological:      Mental Status: She is alert.   Psychiatric:         Mood and Affect: Mood normal.         Behavior: Behavior normal.         Thought Content: Thought content normal.         Judgment: Judgment normal.         Assessment & Plan:   42 y.o. female with the following -    1. Encounter for medical examination to establish care  History, problem list, medications and allergies reviewed.  Records requested from previous provider if applicable.    2. Anxiety  Chronic, well controlled on current medication.  Refill sent.  Discussed that if she does not come off of this we typically would do it after she has been stable for 6 months.  She will let me know if she wants to come off of it.  - escitalopram (LEXAPRO) 10 MG Tab; Take 1 Tablet by mouth every day.  Dispense: 90 Tablet; Refill: 3    3. Chronic cough  Most likely cause is allergies.  She does not have any smoking history.  She does not have any GERD symptoms.  We discussed trying another antihistamine, for example Zyrtec or Allegra to see if it helps with symptoms.  Symptoms do really start to bother her we can always discuss starting a medication like gabapentin.      Return in about 1 year (around 12/12/2023) for Annual.    Please note that this dictation was created using voice recognition software. I have made every reasonable attempt to correct obvious errors, but I expect that there are errors of grammar and possibly content that I did not discover before finalizing the note.

## 2022-12-23 ENCOUNTER — OFFICE VISIT (OUTPATIENT)
Dept: URGENT CARE | Facility: PHYSICIAN GROUP | Age: 42
End: 2022-12-23
Payer: COMMERCIAL

## 2022-12-23 VITALS
OXYGEN SATURATION: 100 % | TEMPERATURE: 97.5 F | SYSTOLIC BLOOD PRESSURE: 98 MMHG | WEIGHT: 125 LBS | HEART RATE: 73 BPM | RESPIRATION RATE: 18 BRPM | BODY MASS INDEX: 19.62 KG/M2 | HEIGHT: 67 IN | DIASTOLIC BLOOD PRESSURE: 60 MMHG

## 2022-12-23 DIAGNOSIS — R05.1 ACUTE COUGH: ICD-10-CM

## 2022-12-23 DIAGNOSIS — U07.1 COVID-19: ICD-10-CM

## 2022-12-23 PROCEDURE — 99213 OFFICE O/P EST LOW 20 MIN: CPT | Performed by: PHYSICIAN ASSISTANT

## 2022-12-23 RX ORDER — BENZONATATE 100 MG/1
100 CAPSULE ORAL 3 TIMES DAILY PRN
Qty: 20 CAPSULE | Refills: 0 | Status: SHIPPED | OUTPATIENT
Start: 2022-12-23 | End: 2023-06-07

## 2022-12-23 ASSESSMENT — FIBROSIS 4 INDEX: FIB4 SCORE: 0.78

## 2022-12-23 ASSESSMENT — ENCOUNTER SYMPTOMS: COUGH: 1

## 2022-12-23 NOTE — PROGRESS NOTES
"Subjective:   Vanessa Mora is a 42 y.o. female who presents for Cough (Since the December 12th. Test positive COVID Dec 13th. Cough not going away. 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  Still with significant fatigue, cough.  No fevers since original diagnosis.  SOB improving, exertional only.  No underlying respiratory illnesses.  Has had mild intermittent cough for about a year of unknown etiology.  Does have seasonal allergies.  Cough is non-productive.     Cough        Review of Systems   Respiratory:  Positive for cough.      Medications:  escitalopram Tabs    Allergies:             Patient has no known allergies.    Surgical History:       No past surgical history on file.    Past Social Hx:  Vanessa Mora  reports that she has never smoked. She has never used smokeless tobacco. She reports current alcohol use of about 1.2 oz per week. She reports that she does not use drugs.     Past Family Hx:   Vanessa Mora family history includes Breast Cancer in her paternal grandmother; Cancer in her half-sister; Cancer (age of onset: 48) in her paternal grandmother; No Known Problems in her father, mother, sister, and sister; Other in her sister.       Problem list, medications, and allergies reviewed by myself today in Epic.     Objective:     BP 98/60   Pulse 73   Temp 36.4 °C (97.5 °F) (Temporal)   Resp 18   Ht 1.702 m (5' 7\")   Wt 56.7 kg (125 lb)   SpO2 100%   BMI 19.58 kg/m²     Physical Exam  Constitutional:       General: She is not in acute distress.     Appearance: She is well-developed. She is not ill-appearing or diaphoretic.   HENT:      Head: Normocephalic.      Right Ear: Tympanic membrane, ear canal and external ear normal.      Left Ear: Tympanic membrane, ear canal and external ear normal.      Nose: Mucosal edema and rhinorrhea present.      Mouth/Throat:      Pharynx: Posterior " oropharyngeal erythema present.   Eyes:      General:         Right eye: No discharge.         Left eye: No discharge.      Conjunctiva/sclera: Conjunctivae normal.      Pupils: Pupils are equal, round, and reactive to light.   Cardiovascular:      Rate and Rhythm: Normal rate and regular rhythm.      Pulses: Normal pulses.      Heart sounds: Normal heart sounds. No murmur heard.    No friction rub.   Pulmonary:      Effort: Pulmonary effort is normal. No accessory muscle usage or respiratory distress.      Breath sounds: No stridor. No wheezing, rhonchi or rales.      Comments: Lungs are clear to auscultation bilaterally, no rhonchi rales or wheezes  Abdominal:      Palpations: Abdomen is soft.   Musculoskeletal:         General: Normal range of motion.      Cervical back: Normal range of motion.      Right lower leg: No edema.      Left lower leg: No edema.   Lymphadenopathy:      Cervical: No cervical adenopathy.   Skin:     General: Skin is warm and dry.   Neurological:      Mental Status: She is alert and oriented to person, place, and time.       Assessment/Plan:     Diagnosis and Associated Orders:     1. COVID-19    2. Acute cough        Comments/MDM:    Patient is 10 days out from original COVID diagnosis.  Her lungs are clear to auscultation bilaterally.  Vital signs are stable and reassuring.  She is not hypoxic.  Her O2 sat 100%.  I think it is safe for her to return to work if she feels well.  She has completed quarantine per CDC guidelines.  We discussed post-COVID course.  We discussed cough measures at home including Tessalon Perles, nasal saline spray, Flonase, nonsedating antihistamine.  Return precautions discussed.  No concerning findings or red flags for bacterial pneumonia.    I personally reviewed prior external notes and test results pertinent to today's visit.  Red flags discussed as well as indications to present to the Emergency Department.  Supportive care, natural history, differential  diagnoses, and indications for immediate follow-up discussed.  Patient expresses understanding and agrees to plan.  Patient denies any other questions or concerns.    Follow-up with the primary care physician for recheck, reevaluation, and consideration of further management.      Please note that this dictation was created using voice recognition software. I have made a reasonable attempt to correct obvious errors, but I expect that there are errors of grammar and possibly content that I did not discover before finalizing the note.    This note was electronically signed by Gina Neri PA-C

## 2023-04-03 ENCOUNTER — EH NON-PROVIDER (OUTPATIENT)
Dept: OCCUPATIONAL MEDICINE | Facility: CLINIC | Age: 43
End: 2023-04-03

## 2023-04-03 DIAGNOSIS — Z02.89 ENCOUNTER FOR OCCUPATIONAL HEALTH ASSESSMENT: ICD-10-CM

## 2023-04-03 PROCEDURE — 94375 RESPIRATORY FLOW VOLUME LOOP: CPT | Performed by: NURSE PRACTITIONER

## 2023-06-07 ENCOUNTER — OFFICE VISIT (OUTPATIENT)
Dept: MEDICAL GROUP | Facility: MEDICAL CENTER | Age: 43
End: 2023-06-07
Payer: COMMERCIAL

## 2023-06-07 VITALS
SYSTOLIC BLOOD PRESSURE: 102 MMHG | HEIGHT: 67 IN | OXYGEN SATURATION: 100 % | RESPIRATION RATE: 17 BRPM | TEMPERATURE: 97 F | HEART RATE: 65 BPM | WEIGHT: 123.46 LBS | DIASTOLIC BLOOD PRESSURE: 70 MMHG | BODY MASS INDEX: 19.38 KG/M2

## 2023-06-07 DIAGNOSIS — M54.2 NECK PAIN: ICD-10-CM

## 2023-06-07 DIAGNOSIS — M54.41 ACUTE RIGHT-SIDED LOW BACK PAIN WITH RIGHT-SIDED SCIATICA: ICD-10-CM

## 2023-06-07 PROCEDURE — 3074F SYST BP LT 130 MM HG: CPT | Performed by: STUDENT IN AN ORGANIZED HEALTH CARE EDUCATION/TRAINING PROGRAM

## 2023-06-07 PROCEDURE — 99214 OFFICE O/P EST MOD 30 MIN: CPT | Performed by: STUDENT IN AN ORGANIZED HEALTH CARE EDUCATION/TRAINING PROGRAM

## 2023-06-07 PROCEDURE — 3078F DIAST BP <80 MM HG: CPT | Performed by: STUDENT IN AN ORGANIZED HEALTH CARE EDUCATION/TRAINING PROGRAM

## 2023-06-07 RX ORDER — CYCLOBENZAPRINE HCL 5 MG
5 TABLET ORAL 3 TIMES DAILY PRN
Qty: 30 TABLET | Refills: 0 | Status: SHIPPED | OUTPATIENT
Start: 2023-06-07

## 2023-06-07 ASSESSMENT — FIBROSIS 4 INDEX: FIB4 SCORE: 0.8

## 2023-06-07 NOTE — PROGRESS NOTES
"Subjective:     CC: Neck pain, back pain    HPI:   Vanessa presents today with several days of neck pain and back pain.  She initially felt an acute injury in her back when she was moving a large patient at work.  Since then she has had spasms of the muscles and pain in the lower back and going down both legs.  Over the last couple days that pain has improved, but she still significant pain going down the right leg.  She also feels muscle spasms and contractures in lower back.  Her neck is also began to hurt and she feels like there are muscle spasms there as well.  She has not had any weakness in the lower extremities.  She has not had any changes in bowel or bladder function.  The pain is made worse by lifting, bending or sitting down    ROS:  ROS    Objective:     Exam:  /70 (BP Location: Left arm, Patient Position: Sitting, BP Cuff Size: Adult)   Pulse 65   Temp 36.1 °C (97 °F) (Temporal)   Resp 17   Ht 1.702 m (5' 7\")   Wt 56 kg (123 lb 7.3 oz)   SpO2 100%   BMI 19.34 kg/m²  Body mass index is 19.34 kg/m².    Physical Exam  Vitals reviewed.   Constitutional:       General: She is not in acute distress.     Appearance: She is not toxic-appearing.   HENT:      Head: Normocephalic and atraumatic.      Right Ear: External ear normal.      Left Ear: External ear normal.   Eyes:      General:         Right eye: No discharge.         Left eye: No discharge.      Extraocular Movements: Extraocular movements intact.      Conjunctiva/sclera: Conjunctivae normal.   Pulmonary:      Effort: Pulmonary effort is normal. No respiratory distress.   Musculoskeletal:      Cervical back: Spasms present. No swelling, deformity or tenderness.      Thoracic back: No swelling, deformity or tenderness.      Lumbar back: Spasms present. No swelling, deformity or tenderness.      Comments: Pain with flexion   Skin:     General: Skin is warm and dry.   Neurological:      Mental Status: She is alert.   Psychiatric:         Mood " and Affect: Mood normal.         Behavior: Behavior normal.         Thought Content: Thought content normal.         Judgment: Judgment normal.           Assessment & Plan:     43 y.o. female with the following -     1. Acute right-sided low back pain with right-sided sciatica  This is an acute injury, some concern for disc injury given her new right-sided sciatica.  X-rays of the lumbar and thoracic spine have been ordered as the pain does go about to the mid back.  Encouraged use of anti-inflammatories, heat, and muscle relaxers.  She understands muscle laxer's will make her drowsy and she cannot drive, drink or work while on these.  She may need a referral to a specialist based on x-rays.  If x-rays are not concerning and pain is not improving she is open to physical therapy  - DX-LUMBAR SPINE-2 OR 3 VIEWS; Future  - DX-THORACIC SPINE-2 VIEWS; Future  - cyclobenzaprine (FLEXERIL) 5 mg tablet; Take 1 Tablet by mouth 3 times a day as needed for Moderate Pain or Muscle Spasms.  Dispense: 30 Tablet; Refill: 0    2. Neck pain  This is a new condition, likely related to her injury that she got at work, possibly a protective spasm.  X-ray ordered, Flexeril given  - DX-CERVICAL SPINE-2 OR 3 VIEWS; Future  - cyclobenzaprine (FLEXERIL) 5 mg tablet; Take 1 Tablet by mouth 3 times a day as needed for Moderate Pain or Muscle Spasms.  Dispense: 30 Tablet; Refill: 0    No follow-ups on file.    Please note that this dictation was created using voice recognition software. I have made every reasonable attempt to correct obvious errors, but I expect that there are errors of grammar and possibly content that I did not discover before finalizing the note.

## 2023-06-08 ENCOUNTER — HOSPITAL ENCOUNTER (OUTPATIENT)
Dept: RADIOLOGY | Facility: MEDICAL CENTER | Age: 43
End: 2023-06-08
Attending: STUDENT IN AN ORGANIZED HEALTH CARE EDUCATION/TRAINING PROGRAM
Payer: COMMERCIAL

## 2023-06-08 ENCOUNTER — PATIENT MESSAGE (OUTPATIENT)
Dept: MEDICAL GROUP | Facility: MEDICAL CENTER | Age: 43
End: 2023-06-08
Payer: COMMERCIAL

## 2023-06-08 DIAGNOSIS — M54.41 ACUTE RIGHT-SIDED LOW BACK PAIN WITH RIGHT-SIDED SCIATICA: ICD-10-CM

## 2023-06-08 DIAGNOSIS — M54.2 NECK PAIN: ICD-10-CM

## 2023-06-08 PROCEDURE — 72040 X-RAY EXAM NECK SPINE 2-3 VW: CPT

## 2023-06-08 PROCEDURE — 72100 X-RAY EXAM L-S SPINE 2/3 VWS: CPT

## 2023-06-08 PROCEDURE — 72070 X-RAY EXAM THORAC SPINE 2VWS: CPT

## 2023-08-31 ENCOUNTER — TELEMEDICINE (OUTPATIENT)
Dept: MEDICAL GROUP | Facility: MEDICAL CENTER | Age: 43
End: 2023-08-31
Payer: COMMERCIAL

## 2023-08-31 VITALS — RESPIRATION RATE: 14 BRPM | WEIGHT: 122 LBS | HEIGHT: 67 IN | BODY MASS INDEX: 19.15 KG/M2

## 2023-08-31 DIAGNOSIS — F41.9 ANXIETY: ICD-10-CM

## 2023-08-31 DIAGNOSIS — G89.29 CHRONIC BILATERAL LOW BACK PAIN WITHOUT SCIATICA: ICD-10-CM

## 2023-08-31 DIAGNOSIS — M54.50 CHRONIC BILATERAL LOW BACK PAIN WITHOUT SCIATICA: ICD-10-CM

## 2023-08-31 PROCEDURE — 99214 OFFICE O/P EST MOD 30 MIN: CPT | Mod: 95 | Performed by: STUDENT IN AN ORGANIZED HEALTH CARE EDUCATION/TRAINING PROGRAM

## 2023-08-31 RX ORDER — ESCITALOPRAM OXALATE 10 MG/1
10 TABLET ORAL DAILY
Qty: 30 TABLET | Refills: 0 | Status: SHIPPED | OUTPATIENT
Start: 2023-08-31

## 2023-08-31 ASSESSMENT — FIBROSIS 4 INDEX: FIB4 SCORE: 0.8

## 2023-08-31 NOTE — PROGRESS NOTES
"Virtual Visit: Established Patient   This visit was conducted via Zoom using secure and encrypted videoconferencing technology.   The patient was in their home in the [facility].    The patient's identity was confirmed and verbal consent was obtained for this virtual visit.    Subjective:   CC:   Chief Complaint   Patient presents with    Referral Needed     PT-[place] [facility]     Paperwork     [facility]      Vanessa Mora is a 43 y.o. female presenting for evaluation and management of:    Problem   Anxiety    Chronic, fully worsening as patient has noticed more stomach upset and shortness of breath.  She would like to restart Lexapro.     Chronic Bilateral Low Back Pain Without Sciatica    This is a chronic condition, uncontrolled.  Patient is in the process of getting set up with physical therapy.  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  She does use Flexeril as needed which works well for her. She has [facility] paperwork to be completed         Current medicines (including changes today)  Current Outpatient Medications   Medication Sig Dispense Refill    escitalopram (LEXAPRO) 10 MG Tab Take 1 Tablet by mouth every day. 30 Tablet 0    cyclobenzaprine (FLEXERIL) 5 mg tablet Take 1 Tablet by mouth 3 times a day as needed for Moderate Pain or Muscle Spasms. 30 Tablet 0     No current facility-administered medications for this visit.       Patient Active Problem List    Diagnosis Date Noted    Chronic cough 12/12/2022    Anxiety 01/14/2022    Chronic bilateral low back pain without sciatica 05/01/2019    Chronic dryness of both eyes 05/01/2019    Fatigue 05/01/2019        Objective:   Resp 14   Ht 1.702 m (5' 7\")   Wt 55.3 kg (122 lb)   BMI 19.11 kg/m²     Physical Exam:  Constitutional: Alert, no distress, well-groomed.  Skin: No rashes in visible areas.  Eye: Round. Conjunctiva clear, lids normal. No icterus.   ENMT: Lips pink without lesions, good " dentition, moist mucous membranes. Phonation normal.  Neck: No masses, no thyromegaly. Moves freely without pain.  Respiratory: Unlabored respiratory effort, no cough or audible wheeze  Psych: Alert and oriented x3, normal affect and mood.     Assessment and Plan:   The following treatment plan was discussed:     1. Chronic bilateral low back pain without sciatica  Family paperwork completed.  Continue Flexeril as needed.  Referral for physical therapy sent.  - Referral to Physical Therapy    2. Anxiety  We discussed the risks of starting an SSRI/SNRI including increased risk of suicidal ideation in the first 2 weeks of taking this medication.  Patient understands and accepts risk.    - escitalopram (LEXAPRO) 10 MG Tab; Take 1 Tablet by mouth every day.  Dispense: 30 Tablet; Refill: 0    Please note that this dictation was created using voice recognition software. I have made every reasonable attempt to correct obvious errors, but I expect that there are errors of grammar and possibly content that I did not discover before finalizing the note.    Follow-up: No follow-ups on file.

## 2024-01-15 PROCEDURE — RXMED WILLOW AMBULATORY MEDICATION CHARGE: Performed by: INTERNAL MEDICINE

## 2024-03-07 ENCOUNTER — OFFICE VISIT (OUTPATIENT)
Dept: MEDICAL GROUP | Facility: MEDICAL CENTER | Age: 44
End: 2024-03-07
Payer: COMMERCIAL

## 2024-03-07 VITALS
RESPIRATION RATE: 16 BRPM | HEIGHT: 67 IN | WEIGHT: 119 LBS | TEMPERATURE: 98.5 F | OXYGEN SATURATION: 99 % | SYSTOLIC BLOOD PRESSURE: 100 MMHG | DIASTOLIC BLOOD PRESSURE: 65 MMHG | HEART RATE: 96 BPM | BODY MASS INDEX: 18.68 KG/M2

## 2024-03-07 DIAGNOSIS — Z13.6 SCREENING FOR CARDIOVASCULAR CONDITION: ICD-10-CM

## 2024-03-07 DIAGNOSIS — R10.2 PELVIC PRESSURE IN FEMALE: ICD-10-CM

## 2024-03-07 DIAGNOSIS — G89.29 CHRONIC BILATERAL LOW BACK PAIN WITHOUT SCIATICA: ICD-10-CM

## 2024-03-07 DIAGNOSIS — Z13.1 SCREENING FOR DIABETES MELLITUS: ICD-10-CM

## 2024-03-07 DIAGNOSIS — M25.561 CHRONIC PAIN OF RIGHT KNEE: ICD-10-CM

## 2024-03-07 DIAGNOSIS — G89.29 CHRONIC PAIN OF RIGHT KNEE: ICD-10-CM

## 2024-03-07 DIAGNOSIS — M54.50 CHRONIC BILATERAL LOW BACK PAIN WITHOUT SCIATICA: ICD-10-CM

## 2024-03-07 DIAGNOSIS — Z13.29 SCREENING FOR THYROID DISORDER: ICD-10-CM

## 2024-03-07 DIAGNOSIS — Z13.0 SCREENING, ANEMIA, DEFICIENCY, IRON: ICD-10-CM

## 2024-03-07 DIAGNOSIS — N92.6 IRREGULAR PERIODS: ICD-10-CM

## 2024-03-07 PROCEDURE — 3078F DIAST BP <80 MM HG: CPT | Performed by: STUDENT IN AN ORGANIZED HEALTH CARE EDUCATION/TRAINING PROGRAM

## 2024-03-07 PROCEDURE — 99214 OFFICE O/P EST MOD 30 MIN: CPT | Performed by: STUDENT IN AN ORGANIZED HEALTH CARE EDUCATION/TRAINING PROGRAM

## 2024-03-07 PROCEDURE — 3074F SYST BP LT 130 MM HG: CPT | Performed by: STUDENT IN AN ORGANIZED HEALTH CARE EDUCATION/TRAINING PROGRAM

## 2024-03-07 NOTE — PROGRESS NOTES
"Subjective:     CC: Periods, pelvic pressure, pain of the right knee, low back pain    HPI:   Vanessa presents today with    Problem   Irregular Periods    Her periods are still regular as far as cycle goes but they have been much crampy her and heavier recently.  Would like evaluation.     Pelvic Pressure in Female    She has noticed a pelvic pressure and the need to urinate urgently.  She has had a history of ovarian cyst in the past.  She has a family history of severe endometriosis     Chronic Pain of Right Knee    This is a chronic condition.  It started with an injury to the knee.  She was concerned that there may be a meniscus injury.  Hoping to see a specialist.     Chronic Bilateral Low Back Pain Without Sciatica    This is a chronic condition, uncontrolled.  She has done physical therapy with minimal results.  She has been x-rays.  This has been getting progressively worse despite conservative management with physical therapy and as needed medications like Flexeril       ROS:  ROS    Objective:     Exam:  /65   Pulse 96   Temp 36.9 °C (98.5 °F) (Temporal)   Resp 16   Ht 1.702 m (5' 7\")   Wt 54 kg (119 lb)   LMP 02/14/2024   SpO2 99%   BMI 18.64 kg/m²  Body mass index is 18.64 kg/m².    Physical Exam  Vitals reviewed.   Constitutional:       General: She is not in acute distress.     Appearance: She is not toxic-appearing.   HENT:      Head: Normocephalic and atraumatic.      Right Ear: External ear normal.      Left Ear: External ear normal.   Eyes:      General:         Right eye: No discharge.         Left eye: No discharge.      Extraocular Movements: Extraocular movements intact.      Conjunctiva/sclera: Conjunctivae normal.   Pulmonary:      Effort: Pulmonary effort is normal. No respiratory distress.   Skin:     General: Skin is warm and dry.   Neurological:      Mental Status: She is alert.   Psychiatric:         Mood and Affect: Mood normal.         Behavior: Behavior normal.         " Thought Content: Thought content normal.         Judgment: Judgment normal.           Assessment & Plan:     43 y.o. female with the following -     1. Chronic pain of right knee  Referral to orthopedics sent  - Referral to Orthopedics    2. Chronic bilateral low back pain without sciatica  Worsening chronic back pain that has failed conservative therapy with physical therapy and Flexeril, MRI ordered  - MR-LUMBAR SPINE-W/O; Future    3. Pelvic pressure in female  UA ordered, pelvic ultrasound ordered  - US-PELVIC COMPLETE (TRANSABDOMINAL/TRANSVAGINAL) (COMBO); Future  - URINALYSIS,CULTURE IF INDICATED; Future    4. Irregular periods  Her change in periods could be because she is perimenopausal although this is a little early.  Labs below and pelvic ultrasound ordered  - FSH; Future  - LUTEINIZING HORMONE SERUM; Future    5. Screening for cardiovascular condition  - Lipid Profile; Future    6. Screening for diabetes mellitus  - Comp Metabolic Panel; Future  - HEMOGLOBIN A1C; Future    7. Screening for thyroid disorder  - TSH WITH REFLEX TO FT4; Future    8. Screening, anemia, deficiency, iron  - CBC WITH DIFFERENTIAL; Future        No follow-ups on file.    Please note that this dictation was created using voice recognition software. I have made every reasonable attempt to correct obvious errors, but I expect that there are errors of grammar and possibly content that I did not discover before finalizing the note.

## 2024-04-05 ENCOUNTER — EH NON-PROVIDER (OUTPATIENT)
Dept: OCCUPATIONAL MEDICINE | Facility: CLINIC | Age: 44
End: 2024-04-05

## 2024-04-05 DIAGNOSIS — Z02.89 ENCOUNTER FOR OCCUPATIONAL HEALTH EXAMINATION INVOLVING RESPIRATOR: ICD-10-CM

## 2024-04-05 PROCEDURE — 94375 RESPIRATORY FLOW VOLUME LOOP: CPT | Performed by: PREVENTIVE MEDICINE

## 2024-04-08 ENCOUNTER — PATIENT MESSAGE (OUTPATIENT)
Dept: MEDICAL GROUP | Facility: MEDICAL CENTER | Age: 44
End: 2024-04-08
Payer: COMMERCIAL

## 2024-04-08 DIAGNOSIS — G89.29 CHRONIC MIDLINE THORACIC BACK PAIN: ICD-10-CM

## 2024-04-08 DIAGNOSIS — M54.6 CHRONIC MIDLINE THORACIC BACK PAIN: ICD-10-CM

## 2024-04-10 ENCOUNTER — APPOINTMENT (OUTPATIENT)
Dept: RADIOLOGY | Facility: MEDICAL CENTER | Age: 44
End: 2024-04-10
Attending: STUDENT IN AN ORGANIZED HEALTH CARE EDUCATION/TRAINING PROGRAM
Payer: COMMERCIAL

## 2024-04-10 DIAGNOSIS — M54.50 CHRONIC BILATERAL LOW BACK PAIN WITHOUT SCIATICA: ICD-10-CM

## 2024-04-10 DIAGNOSIS — G89.29 CHRONIC BILATERAL LOW BACK PAIN WITHOUT SCIATICA: ICD-10-CM

## 2024-04-10 PROCEDURE — 72148 MRI LUMBAR SPINE W/O DYE: CPT

## 2024-05-10 ENCOUNTER — HOSPITAL ENCOUNTER (OUTPATIENT)
Dept: RADIOLOGY | Facility: MEDICAL CENTER | Age: 44
End: 2024-05-10
Attending: STUDENT IN AN ORGANIZED HEALTH CARE EDUCATION/TRAINING PROGRAM
Payer: COMMERCIAL

## 2024-05-10 DIAGNOSIS — R10.2 PELVIC PRESSURE IN FEMALE: ICD-10-CM

## 2024-05-12 ENCOUNTER — PATIENT MESSAGE (OUTPATIENT)
Dept: MEDICAL GROUP | Facility: MEDICAL CENTER | Age: 44
End: 2024-05-12
Payer: COMMERCIAL

## 2024-05-12 DIAGNOSIS — D21.9 FIBROID: ICD-10-CM

## 2024-05-12 DIAGNOSIS — R10.2 PELVIC PRESSURE IN FEMALE: ICD-10-CM

## 2024-05-12 DIAGNOSIS — N92.6 IRREGULAR PERIODS: ICD-10-CM

## 2024-07-30 ENCOUNTER — HOSPITAL ENCOUNTER (OUTPATIENT)
Dept: RADIOLOGY | Facility: MEDICAL CENTER | Age: 44
End: 2024-07-30
Attending: STUDENT IN AN ORGANIZED HEALTH CARE EDUCATION/TRAINING PROGRAM
Payer: COMMERCIAL

## 2024-07-30 DIAGNOSIS — Z12.31 VISIT FOR SCREENING MAMMOGRAM: ICD-10-CM

## 2024-07-30 PROCEDURE — 77063 BREAST TOMOSYNTHESIS BI: CPT

## 2024-08-09 ENCOUNTER — PATIENT MESSAGE (OUTPATIENT)
Dept: MEDICAL GROUP | Facility: MEDICAL CENTER | Age: 44
End: 2024-08-09
Payer: COMMERCIAL

## 2024-08-09 DIAGNOSIS — R09.A2 GLOBUS SENSATION: ICD-10-CM

## 2024-08-09 DIAGNOSIS — D21.9 FIBROIDS: ICD-10-CM

## 2024-08-09 DIAGNOSIS — J32.9 CHRONIC CONGESTION OF PARANASAL SINUS: ICD-10-CM

## 2024-09-10 ENCOUNTER — HOSPITAL ENCOUNTER (OUTPATIENT)
Facility: MEDICAL CENTER | Age: 44
End: 2024-09-10
Attending: OBSTETRICS & GYNECOLOGY
Payer: COMMERCIAL

## 2024-09-10 ENCOUNTER — GYNECOLOGY VISIT (OUTPATIENT)
Dept: OBGYN | Facility: CLINIC | Age: 44
End: 2024-09-10
Payer: COMMERCIAL

## 2024-09-10 VITALS
SYSTOLIC BLOOD PRESSURE: 91 MMHG | HEIGHT: 67 IN | BODY MASS INDEX: 19.34 KG/M2 | DIASTOLIC BLOOD PRESSURE: 69 MMHG | WEIGHT: 123.2 LBS

## 2024-09-10 DIAGNOSIS — N90.89 VULVAR SKIN TAG: ICD-10-CM

## 2024-09-10 DIAGNOSIS — N91.5 OLIGOMENORRHEA, UNSPECIFIED TYPE: ICD-10-CM

## 2024-09-10 DIAGNOSIS — R10.2 PELVIC PRESSURE IN FEMALE: ICD-10-CM

## 2024-09-10 DIAGNOSIS — Z12.4 CERVICAL CANCER SCREENING: ICD-10-CM

## 2024-09-10 DIAGNOSIS — N90.89 VULVAR IRRITATION: ICD-10-CM

## 2024-09-10 LAB — PATHOLOGY CONSULT NOTE: NORMAL

## 2024-09-10 PROCEDURE — 88304 TISSUE EXAM BY PATHOLOGIST: CPT

## 2024-09-10 PROCEDURE — 3078F DIAST BP <80 MM HG: CPT | Performed by: OBSTETRICS & GYNECOLOGY

## 2024-09-10 PROCEDURE — 88175 CYTOPATH C/V AUTO FLUID REDO: CPT

## 2024-09-10 PROCEDURE — 87624 HPV HI-RISK TYP POOLED RSLT: CPT

## 2024-09-10 PROCEDURE — 56605 BIOPSY OF VULVA/PERINEUM: CPT | Performed by: OBSTETRICS & GYNECOLOGY

## 2024-09-10 PROCEDURE — 99386 PREV VISIT NEW AGE 40-64: CPT | Mod: 25 | Performed by: OBSTETRICS & GYNECOLOGY

## 2024-09-10 PROCEDURE — 3074F SYST BP LT 130 MM HG: CPT | Performed by: OBSTETRICS & GYNECOLOGY

## 2024-09-10 NOTE — PROGRESS NOTES
ANNUAL GYNECOLOGY VISIT    Chief Complaint  Annual Exam  Gynecologic Exam (NEW GYN-Consult for Fibroids)      Subjective  Vanessa Mora is a 44 y.o. female who presents today for Annual Exam. She recently had US to evaluate for cause of pelvic pressure and recent irregular menses. US demonstrated fibroid uterus with 2 small fibroids.  Reports menses are now regularly irregular coming every ~2 months. Can be a little heavier, but not bothersome. Pressure is also periodic. Denies pelvic pain or abnormal vaginal dc. Rarely will get urgency, but denies UI or FRANTZ.     Notes a symptomatic vulvar skin tag that is bothersome with biking and running. Denies skin changes, itching, or changes in skin. Recent Mammogram, denies breast symptoms including pain, lumps/bumps, rashes, pain, nipple discharge.     Sister has endometriosis that is very symptomatic and wondering about this as well.     Preventive Care   Immunization History   Administered Date(s) Administered    INFLUENZA TIV (IM) 10/04/2018    Influenza (IM) Preservative Free - HISTORICAL DATA 01/15/2024    Influenza Vaccine Quad Inj (Pf) 10/09/2017, 09/23/2019, 11/20/2020    MMR Vaccine 10/19/1983, 05/03/1994    PFIZER PURPLE CAP SARS-COV-2 VACCINATION (12+) 01/15/2021, 02/05/2021, 11/07/2021    QUANTIFERON GOLD - HISTORICAL DATA 04/04/2018    Tdap Vaccine 04/04/2014, 12/13/2020    Tuberculin Skin Test 03/26/2019, 02/22/2020     Last Mammogram: Yes, date 7/30/ 24  Last Colonoscopy: No  Last DEXA: No  H/o osteoporosis or osteopenia: no  Intimate partner violence (patient interviewed when partner was not in the room): Negative    Gynecology History  Current Sexual Activity: yes, 1 partner, spouce  History of sexually transmitted diseases?  no  Abnormal discharge?  no   Menopause: No; none;   Postmenopausal bleeding: N/A  Urinary incontinence: no    Menstrual History  Patient's last menstrual period was 08/23/2024 (exact date).  Periods are irregular recently,  "lasting 3-5  days. Previously regular when on Ocps. Stopped with  vasectomy. Slightly heavier periods with irregularity as well.  Dysmenorrhea :more pressure and some back cramping. No intermenstrual bleeding, spotting, or discharge.    Contraception  Current: Vasectomy  Past:MARLENY's    Pap History  No components found for: \"PAPSMEAR\"   Any abnormal pap smears?  no    Obstetric History  OB History    Para Term  AB Living   1 0 0 0 1 0   SAB IAB Ectopic Molar Multiple Live Births   0 1 0 0 0 0      # Outcome Date GA Lbr Jhon/2nd Weight Sex Type Anes PTL Lv   1 IAB                Past Medical History  Past Medical History:   Diagnosis Date    Anxiety        Past Surgical History  History reviewed. No pertinent surgical history.    Social History  Social History     Socioeconomic History    Marital status:      Spouse name: Not on file    Number of children: Not on file    Years of education: Not on file    Highest education level: Bachelor's degree (e.g., BA, AB, BS)   Occupational History    Not on file   Tobacco Use    Smoking status: Never    Smokeless tobacco: Never   Vaping Use    Vaping status: Never Used   Substance and Sexual Activity    Alcohol use: Yes     Alcohol/week: 1.2 oz     Types: 2 Glasses of wine per week     Comment: occasional    Drug use: No    Sexual activity: Yes     Partners: Male     Birth control/protection: Condom   Other Topics Concern    Not on file   Social History Narrative    Not on file     Social Determinants of Health     Financial Resource Strain: Low Risk  (2022)    Overall Financial Resource Strain (CARDIA)     Difficulty of Paying Living Expenses: Not hard at all   Food Insecurity: No Food Insecurity (2022)    Hunger Vital Sign     Worried About Running Out of Food in the Last Year: Never true     Ran Out of Food in the Last Year: Never true   Transportation Needs: No Transportation Needs (2022)    PRAPARE - Transportation     Lack of " Transportation (Medical): No     Lack of Transportation (Non-Medical): No   Physical Activity: Sufficiently Active (12/12/2022)    Exercise Vital Sign     Days of Exercise per Week: 5 days     Minutes of Exercise per Session: 60 min   Stress: No Stress Concern Present (12/12/2022)    [place] [employer] - Occupational Stress Questionnaire     Feeling of Stress : Not at all   Social Connections: Moderately Integrated (12/12/2022)    Social Connection and Isolation Panel [NHANES]     Frequency of Communication with Friends and Family: More than three times a week     Frequency of Social Gatherings with Friends and Family: More than three times a week     Attends Rastafarian Services: Never     Active Member of Clubs or Organizations: Yes     Attends Club or Organization Meetings: 1 to 4 times per year     Marital Status:    Intimate Partner Violence: Not on file   Housing Stability: Low Risk  (12/12/2022)    Housing Stability Vital Sign     Unable to Pay for Housing in the Last Year: No     Number of Places Lived in the Last Year: 1     Unstable Housing in the Last Year: No       Family History  Family History   Problem Relation Age of Onset    No Known Problems Mother     No Known Problems Father     No Known Problems Sister     No Known Problems Sister     Other Sister         MS    Breast Cancer Paternal Grandmother     Cancer Paternal Grandmother 48        Breast    Cancer Half-sister     Colorectal Cancer Neg Hx     Tubal Cancer Neg Hx     Ovarian Cancer Neg Hx     Peritoneal Cancer Neg Hx        Home Medications  Current Outpatient Medications on File Prior to Visit   Medication Sig Dispense Refill    influenza vaccine quad (FLUZONE QUADRIVALENT) 0.5 ML Suspension Prefilled Syringe injection Inject  into the shoulder, thigh, or buttocks. 0.5 mL 0    escitalopram (LEXAPRO) 10 MG Tab Take 1 Tablet by mouth every day. 30 Tablet 0    cyclobenzaprine (FLEXERIL) 5 mg tablet Take 1 Tablet by  "mouth 3 times a day as needed for Moderate Pain or Muscle Spasms. 30 Tablet 0     No current facility-administered medications on file prior to visit.     Allergies/Reactions  No Known Allergies    ROS  Review of Systems:  Gen: no fevers or chills, no significant weight loss or gain  Respiratory:  no cough or dyspnea  Cardiac:  no chest pain, no palpitations, no syncope  GI:  no heartburn, no abdominal pain, no nausea or vomiting  Psych: no depression or anxiety    Objective  BP 91/69 (BP Location: Left arm, Patient Position: Sitting, BP Cuff Size: Large adult)   Ht 5' 7\"   Wt 123 lb 3.2 oz   LMP 08/23/2024 (Exact Date)   BMI 19.30 kg/m²   Physical Exam  Constitutional:       Appearance: Normal appearance. She is normal weight.   HENT:      Head: Normocephalic.   Eyes:      Extraocular Movements: Extraocular movements intact.      Conjunctiva/sclera: Conjunctivae normal.      Pupils: Pupils are equal, round, and reactive to light.   Cardiovascular:      Rate and Rhythm: Normal rate.   Pulmonary:      Effort: Pulmonary effort is normal.   Abdominal:      General: Abdomen is flat.      Palpations: Abdomen is soft.   Genitourinary:     General: Normal vulva.          Comments: Normal external genitalia with small fleshy skin tag at superior aspect of labia majora, approx 4x5 mm. No other skin changes or lesions of vulva. Normal Vagina and mucosa, normal physiologic discharge. Normal appearing nulliparous cervix without lesions.   Bimanual: slightly enlarged uterus with fibroid palpable on right aspect, non-tender, no adenxal masses or tenderness.   Musculoskeletal:         General: Normal range of motion.      Cervical back: Normal range of motion.   Skin:     General: Skin is warm and dry.   Neurological:      General: No focal deficit present.      Mental Status: She is alert.   Psychiatric:         Mood and Affect: Mood normal.         Behavior: Behavior normal.         Labs/Imaging:  HDL (mg/dL)   Date Value " "  10/11/2021 63     LDL (mg/dL)   Date Value   10/11/2021 95     No components found for: \"A1C1\"  WBC (K/uL)   Date Value   10/11/2021 4.6 (L)     Lab Results   Component Value Date/Time    CO2 23 10/11/2021 1023    BUN 10 10/11/2021 1023     Pelvic US 5/2024  FINDINGS:  Both transabdominal and transvaginal scanning were performed to optimally visualize the pelvis.     UTERUS:  The uterus measures 3.69 cm x 7.36 cm x 4.71 cm.  The uterine myometrium is heterogeneous. There are uterine fibroids with the largest in the left body measuring 3.2 x 3.0 x 3.3 cm. There is a smaller one in the anterior right mid body measuring 1.0 x 1.0 x 1.1 cm.     The endometrial echo complex measures 0.41 cm.  The endometrium is unremarkable in appearance and thickness for age and menstrual status.  OVARIES:  The right ovary measures 3.62 cm x 2.54 cm x 2.20 cm. Duplex Doppler examination of the right ovary shows normal waveforms. The right ovary is normal in size and appearance.     The left ovary measures 3.15 cm x 2.37 cm x 2.20 cm. Duplex Doppler examination of the left ovary shows normal waveforms. The left ovary is normal in size and appearance.    There is no free fluid seen.     IMPRESSION:  1.  There are 2 uterine fibroids as described above largest measuring 3.3 cm in diameter.    Assessment & Plan  Vanessa Mora is a 44 y.o. female who presents today for Annual Gyn Exam and f/u of pelvic US.     1. Health Maintenance   PAP: Yes, collected.   STI Screen (HIV, Syphilis, Chlamydia / Gonorrhea): declines  MAMMOGRAM: UTD, last 7/2024. Normal.   COLONOSCOPY: Not indicated  DEXA: Not indicated   IMMUNIZATIONS: Recommended Flu and vaccine each season and COVID boosters when indicated.  TOBACCO: Encouraged continued abstinence.  DIABETES SCREEN: Pending, ordered by PCP  CHOLESTEROL SCREEN: previously done by PCP, wnl   COUNSELING: breast self exam  CONTRACEPTION: Vasectomy    2. Fibroid uterus, occasional pelvic pressure  With " regards to her uterine fibroids, we reviewed her imaging results. We discussed the diagnosis of uterine fibroids. Uterine leiomyomas (fibroids or myomas) are benign muscle/tumors that form in the uterus. Fibroids can both grow (most common) and regress with time (most common after menopause).   Asymptomatic uterine leiomyomas can usually be followed without intervention. Relief of symptoms (eg, abnormal uterine bleeding, pain, pressure) is the major goal in management of women with significant symptoms. The type and timing of any intervention should be individualized, based upon factors such as: type and severity of symptoms, size of the fibroids, location of the fibroids, patient age, desire for future pregnancy.   - Given her current symptoms are not very bothersome at this time, shared decision making was used to decide on expectant management at this time  - PFPT referral placed     3. Oligomenorrhea, AUB  - DDX:  - CBC, TSH, FSH/LH ordered by PC. Add E2 and PRL.   - If w/u indicates cause, will treat as such. If menses do no regulate on their own, with treatment for cause from w/u, or if menses become very heavy- will discuss endometrial protection with hormonal tx.   - EMB not indicated at this time give low risk for hyperplasia/malignancy and menses not particularly heavy. Reassess as indicated.     4. Symptomatic vulvar skin tag, vulvar irritation  - Patient desires removal given symptoms  - See Procedure note    Return: Annually or PRN; will message with lab results and return as needed for further care    Lilly Abbasi MD  Obstetrics and Gynecology

## 2024-09-10 NOTE — PROCEDURES
Vulvar skin tag excision    Patient reports symptomatic skin tag of right labia. Bothers her and desires removal. Reviewed rare risk if bleeding, infection, damage to surrounding structures, need for additional procedures, and unexpected findings on pathology requiring follow-up. Verbal consent obtained.    A brief timeout was performed and patient ID and procedure were confirmed.     The skin tag was identified on the right vulva, approx 4 x5 mm on the superior aspect of the right labia majora. The base of the tag was infiltrated with 1ml of 1% lidocaine. Next the skin tag was grasped with forceps and elevated to show the thin stalk at the base. The was cut with sterile scissors and the skin tag was removed and sent for pathology.    The base wound was ~ 1 mm in size and hemostasis was appreciated with pressure.    A chaperone was present for the entire sensitive portion of the exam and/or procedure(s).       The patient tolerated the procedure well.    Lilly Abbasi MD  Obstetrics and Gynecology

## 2024-09-10 NOTE — PATIENT INSTRUCTIONS
Local Pelvic Physical Therapists    [facility]:   Meena Page DPT and Afua Lemus DPT  [address], [place] [address]  [phone]  [email or web address]  [facility]:  Merline Kenny DPT  [address], [address], [place], [place] [address]  Phone: [phone]  [email or web address]  [facility]:  Megan Willingham DPT  [address] [place] [address] [place] [address]  Phone: [phone]  [email or web address]  [facility]  Georgie Lin MPT  [address], [place] [address]  Phone: [phone]  [email or web address]  [facility]  Nerissa Guevara MSPT  [address], [place], [place], [address]  Phone: [phone]  [email or web address]

## 2024-09-10 NOTE — PROGRESS NOTES
Pt here to discuss   Pt states normal periods, lower abd pressure, some pain with intercourse no bleeding after  US: 5/10/24   Last pap: 2020 WNL (-) HPV, no Hx of abnormal pap   BCM: male sterilization   LMP: 8/23/24  Good # [phone]  Pharmacy verified.

## 2024-09-17 LAB
CYTOLOGIST CVX/VAG CYTO: NORMAL
CYTOLOGY CVX/VAG DOC CYTO: NORMAL
CYTOLOGY CVX/VAG DOC THIN PREP: NORMAL
HPV I/H RISK 4 DNA CVX QL PROBE+SIG AMP: NEGATIVE
NOTE NL11727A: NORMAL
OTHER STN SPEC: NORMAL
QC REVIEWED BY NL11722A: NORMAL
STAT OF ADQ CVX/VAG CYTO-IMP: NORMAL

## 2024-10-02 ENCOUNTER — PHARMACY VISIT (OUTPATIENT)
Dept: PHARMACY | Facility: MEDICAL CENTER | Age: 44
End: 2024-10-02
Payer: MEDICARE

## 2024-10-15 ENCOUNTER — IMMUNIZATION (OUTPATIENT)
Dept: OCCUPATIONAL MEDICINE | Facility: CLINIC | Age: 44
End: 2024-10-15

## 2024-10-15 DIAGNOSIS — Z23 NEED FOR VACCINATION: Primary | ICD-10-CM

## 2024-10-15 PROCEDURE — 90656 IIV3 VACC NO PRSV 0.5 ML IM: CPT

## 2024-11-20 ENCOUNTER — HOSPITAL ENCOUNTER (OUTPATIENT)
Dept: LAB | Facility: MEDICAL CENTER | Age: 44
End: 2024-11-20
Attending: STUDENT IN AN ORGANIZED HEALTH CARE EDUCATION/TRAINING PROGRAM
Payer: COMMERCIAL

## 2024-11-20 DIAGNOSIS — N92.6 IRREGULAR PERIODS: ICD-10-CM

## 2024-11-20 DIAGNOSIS — Z13.1 SCREENING FOR DIABETES MELLITUS: ICD-10-CM

## 2024-11-20 DIAGNOSIS — Z13.6 SCREENING FOR CARDIOVASCULAR CONDITION: ICD-10-CM

## 2024-11-20 DIAGNOSIS — Z13.0 SCREENING, ANEMIA, DEFICIENCY, IRON: ICD-10-CM

## 2024-11-20 DIAGNOSIS — Z13.29 SCREENING FOR THYROID DISORDER: ICD-10-CM

## 2024-11-20 LAB
ALBUMIN SERPL BCP-MCNC: 4.2 G/DL (ref 3.2–4.9)
ALBUMIN/GLOB SERPL: 1.6 G/DL
ALP SERPL-CCNC: 52 U/L (ref 30–99)
ALT SERPL-CCNC: 14 U/L (ref 2–50)
ANION GAP SERPL CALC-SCNC: 10 MMOL/L (ref 7–16)
AST SERPL-CCNC: 27 U/L (ref 12–45)
BASOPHILS # BLD AUTO: 2 % (ref 0–1.8)
BASOPHILS # BLD: 0.1 K/UL (ref 0–0.12)
BILIRUB SERPL-MCNC: 0.6 MG/DL (ref 0.1–1.5)
BUN SERPL-MCNC: 12 MG/DL (ref 8–22)
CALCIUM ALBUM COR SERPL-MCNC: 8.5 MG/DL (ref 8.5–10.5)
CALCIUM SERPL-MCNC: 8.7 MG/DL (ref 8.5–10.5)
CHLORIDE SERPL-SCNC: 106 MMOL/L (ref 96–112)
CHOLEST SERPL-MCNC: 177 MG/DL (ref 100–199)
CO2 SERPL-SCNC: 23 MMOL/L (ref 20–33)
CREAT SERPL-MCNC: 0.6 MG/DL (ref 0.5–1.4)
EOSINOPHIL # BLD AUTO: 0.17 K/UL (ref 0–0.51)
EOSINOPHIL NFR BLD: 3.4 % (ref 0–6.9)
ERYTHROCYTE [DISTWIDTH] IN BLOOD BY AUTOMATED COUNT: 41.5 FL (ref 35.9–50)
EST. AVERAGE GLUCOSE BLD GHB EST-MCNC: 97 MG/DL
FASTING STATUS PATIENT QL REPORTED: NORMAL
FSH SERPL-ACNC: 7.4 MIU/ML
GFR SERPLBLD CREATININE-BSD FMLA CKD-EPI: 114 ML/MIN/1.73 M 2
GLOBULIN SER CALC-MCNC: 2.6 G/DL (ref 1.9–3.5)
GLUCOSE SERPL-MCNC: 82 MG/DL (ref 65–99)
HBA1C MFR BLD: 5 % (ref 4–5.6)
HCT VFR BLD AUTO: 39.4 % (ref 37–47)
HDLC SERPL-MCNC: 69 MG/DL
HGB BLD-MCNC: 13.4 G/DL (ref 12–16)
IMM GRANULOCYTES # BLD AUTO: 0.01 K/UL (ref 0–0.11)
IMM GRANULOCYTES NFR BLD AUTO: 0.2 % (ref 0–0.9)
LDLC SERPL CALC-MCNC: 98 MG/DL
LH SERPL-ACNC: 15.6 IU/L
LYMPHOCYTES # BLD AUTO: 1.82 K/UL (ref 1–4.8)
LYMPHOCYTES NFR BLD: 36.5 % (ref 22–41)
MCH RBC QN AUTO: 31.6 PG (ref 27–33)
MCHC RBC AUTO-ENTMCNC: 34 G/DL (ref 32.2–35.5)
MCV RBC AUTO: 92.9 FL (ref 81.4–97.8)
MONOCYTES # BLD AUTO: 0.39 K/UL (ref 0–0.85)
MONOCYTES NFR BLD AUTO: 7.8 % (ref 0–13.4)
NEUTROPHILS # BLD AUTO: 2.49 K/UL (ref 1.82–7.42)
NEUTROPHILS NFR BLD: 50.1 % (ref 44–72)
NRBC # BLD AUTO: 0 K/UL
NRBC BLD-RTO: 0 /100 WBC (ref 0–0.2)
PLATELET # BLD AUTO: 285 K/UL (ref 164–446)
PMV BLD AUTO: 10.4 FL (ref 9–12.9)
POTASSIUM SERPL-SCNC: 4.4 MMOL/L (ref 3.6–5.5)
PROT SERPL-MCNC: 6.8 G/DL (ref 6–8.2)
RBC # BLD AUTO: 4.24 M/UL (ref 4.2–5.4)
SODIUM SERPL-SCNC: 139 MMOL/L (ref 135–145)
TRIGL SERPL-MCNC: 51 MG/DL (ref 0–149)
TSH SERPL DL<=0.005 MIU/L-ACNC: 2.37 UIU/ML (ref 0.38–5.33)
WBC # BLD AUTO: 5 K/UL (ref 4.8–10.8)

## 2024-11-20 PROCEDURE — 80061 LIPID PANEL: CPT

## 2024-11-20 PROCEDURE — 36415 COLL VENOUS BLD VENIPUNCTURE: CPT

## 2024-11-20 PROCEDURE — 83001 ASSAY OF GONADOTROPIN (FSH): CPT

## 2024-11-20 PROCEDURE — 83036 HEMOGLOBIN GLYCOSYLATED A1C: CPT

## 2024-11-20 PROCEDURE — 84443 ASSAY THYROID STIM HORMONE: CPT

## 2024-11-20 PROCEDURE — 80053 COMPREHEN METABOLIC PANEL: CPT

## 2024-11-20 PROCEDURE — 85025 COMPLETE CBC W/AUTO DIFF WBC: CPT

## 2024-11-20 PROCEDURE — 83002 ASSAY OF GONADOTROPIN (LH): CPT

## 2024-11-22 ENCOUNTER — OFFICE VISIT (OUTPATIENT)
Dept: MEDICAL GROUP | Facility: MEDICAL CENTER | Age: 44
End: 2024-11-22
Payer: COMMERCIAL

## 2024-11-22 VITALS
TEMPERATURE: 97.8 F | WEIGHT: 129.4 LBS | OXYGEN SATURATION: 100 % | SYSTOLIC BLOOD PRESSURE: 102 MMHG | DIASTOLIC BLOOD PRESSURE: 64 MMHG | HEART RATE: 74 BPM | BODY MASS INDEX: 20.31 KG/M2 | HEIGHT: 67 IN

## 2024-11-22 DIAGNOSIS — M54.50 CHRONIC BILATERAL LOW BACK PAIN WITHOUT SCIATICA: ICD-10-CM

## 2024-11-22 DIAGNOSIS — G89.29 CHRONIC BILATERAL LOW BACK PAIN WITHOUT SCIATICA: ICD-10-CM

## 2024-11-22 DIAGNOSIS — R93.1 ABNORMAL ECHOCARDIOGRAM: ICD-10-CM

## 2024-11-22 PROBLEM — R53.83 FATIGUE: Status: RESOLVED | Noted: 2019-05-01 | Resolved: 2024-11-22

## 2024-11-22 PROBLEM — N92.6 IRREGULAR PERIODS: Status: RESOLVED | Noted: 2024-03-07 | Resolved: 2024-11-22

## 2024-11-22 PROBLEM — F41.9 ANXIETY: Status: RESOLVED | Noted: 2022-01-14 | Resolved: 2024-11-22

## 2024-11-22 PROBLEM — H04.123 CHRONIC DRYNESS OF BOTH EYES: Status: RESOLVED | Noted: 2019-05-01 | Resolved: 2024-11-22

## 2024-11-22 PROBLEM — M25.561 CHRONIC PAIN OF RIGHT KNEE: Status: RESOLVED | Noted: 2024-03-07 | Resolved: 2024-11-22

## 2024-11-22 PROCEDURE — 3074F SYST BP LT 130 MM HG: CPT | Performed by: PHYSICIAN ASSISTANT

## 2024-11-22 PROCEDURE — 3078F DIAST BP <80 MM HG: CPT | Performed by: PHYSICIAN ASSISTANT

## 2024-11-22 PROCEDURE — 99214 OFFICE O/P EST MOD 30 MIN: CPT | Performed by: PHYSICIAN ASSISTANT

## 2024-11-22 ASSESSMENT — PATIENT HEALTH QUESTIONNAIRE - PHQ9: CLINICAL INTERPRETATION OF PHQ2 SCORE: 0

## 2024-11-22 ASSESSMENT — FIBROSIS 4 INDEX: FIB4 SCORE: 1.06

## 2024-11-23 NOTE — PROGRESS NOTES
Subjective:     History of Present Illness  The patient is a 44-year-old female who presents for evaluation of multiple medical concerns.    She recently discovered that an echocardiogram performed during her teenage years revealed mild to moderate regurgitation in her mitral, tricuspid, and pulmonic valves. She is uncertain if this condition has resolved over time. She reports no shortness of breath or chest pain. She maintains an active lifestyle and occasionally experiences premature ventricular contractions (PVCs), which she attributes to excessive caffeine intake and night shifts.     She continues to experience pelvic pressure to be triggered by her chronic low back pain and plans to consult a physical therapist.     Her chronic dry eyes have improved since the cessation of COVID-19, which she believes was exacerbated by constant mask-wearing. Her right knee pain has improved with exercise.    She does not take any medication for anxiety, which she believes was related to the stress of the COVID-19 pandemic. She continues to have a chronic cough, which she suspects is allergy-related. She had previously consulted an allergist for this issue, but the treatment did not provide relief. She reports no reflux or heartburn.    She had chickenpox as a child. She has been making efforts to improve her health in recent years. She reports no feelings of depression or suicidal ideation.      Current medicines (including changes today)  Current Outpatient Medications   Medication Sig Dispense Refill    influenza vaccine quad (FLUZONE QUADRIVALENT) 0.5 ML Suspension Prefilled Syringe injection Inject  into the shoulder, thigh, or buttocks. 0.5 mL 0    cyclobenzaprine (FLEXERIL) 5 mg tablet Take 1 Tablet by mouth 3 times a day as needed for Moderate Pain or Muscle Spasms. 30 Tablet 0     No current facility-administered medications for this visit.     She  has a past medical history of Anxiety.    ROS   No chest pain, no  "shortness of breath, no abdominal pain  Positive ROS as per HPI.  All other systems reviewed and are negative.     Objective:     /64 (BP Location: Right arm, Patient Position: Sitting, BP Cuff Size: Adult)   Pulse 74   Temp 36.6 °C (97.8 °F) (Temporal)   Ht 1.702 m (5' 7\")   Wt 58.7 kg (129 lb 6.4 oz)   SpO2 100%  Body mass index is 20.27 kg/m².   Physical Exam    Constitutional: Alert, no distress.  Skin: Warm, dry, good turgor, no rashes in visible areas.  Eye: Equal, round and reactive, conjunctiva clear, lids normal.  ENMT: Lips without lesions, good dentition, oropharynx clear.  Neck: Trachea midline, no masses, no thyromegaly.   Psych: Alert and oriented x3, normal affect and mood.      Results          Assessment and Plan:   The following treatment plan was discussed    Assessment & Plan  1. Mitral, tricuspid, and pulmonic regurgitation on ECHO.  Given her history of regurgitation at the age of 20, it is prudent to reassess her current cardiac status. An echocardiogram will be repeated to evaluate the current state of her cardiac valves. She reports occasional palpitations but no shortness of breath or chest pain. She is advised to call the 8100 line to schedule the echocardiogram.    2. Chronic back pain.  She continues to experience chronic back pain without sciatica. She will see a physical therapist as her women's health doctor suggested it could be related to a pelvic floor issue.      ORDERS:  1. Abnormal echocardiogram    - EC-ECHOCARDIOGRAM COMPLETE W/O CONT; Future    2. Chronic bilateral low back pain without sciatica          Please note that this dictation was created using voice recognition software. I have made every reasonable attempt to correct obvious errors, but I expect that there are errors of grammar and possibly content that I did not discover before finalizing the note.      Attestation      Verbal consent was acquired by the patient to use [employer] ambient listening note " generation during this visit Yes

## 2025-02-10 ENCOUNTER — HOSPITAL ENCOUNTER (OUTPATIENT)
Dept: CARDIOLOGY | Facility: MEDICAL CENTER | Age: 45
End: 2025-02-10
Attending: PHYSICIAN ASSISTANT
Payer: COMMERCIAL

## 2025-02-10 DIAGNOSIS — R93.1 ABNORMAL ECHOCARDIOGRAM: ICD-10-CM

## 2025-02-10 LAB
LV EJECT FRACT  99904: 66
LV EJECT FRACT MOD 2C 99903: 64.38
LV EJECT FRACT MOD 4C 99902: 68.27
LV EJECT FRACT MOD BP 99901: 66.37

## 2025-02-10 PROCEDURE — 93306 TTE W/DOPPLER COMPLETE: CPT

## 2025-02-10 PROCEDURE — 93306 TTE W/DOPPLER COMPLETE: CPT | Mod: 26 | Performed by: INTERNAL MEDICINE

## 2025-04-03 ENCOUNTER — EH NON-PROVIDER (OUTPATIENT)
Dept: OCCUPATIONAL MEDICINE | Facility: CLINIC | Age: 45
End: 2025-04-03

## 2025-04-03 DIAGNOSIS — Z02.89 ENCOUNTER FOR OCCUPATIONAL HEALTH EXAMINATION INVOLVING RESPIRATOR: ICD-10-CM

## 2025-04-03 PROCEDURE — 94375 RESPIRATORY FLOW VOLUME LOOP: CPT | Performed by: PREVENTIVE MEDICINE

## 2025-05-24 ENCOUNTER — NON-PROVIDER VISIT (OUTPATIENT)
Dept: URGENT CARE | Facility: PHYSICIAN GROUP | Age: 45
End: 2025-05-24
Payer: COMMERCIAL

## 2025-05-24 ENCOUNTER — OCCUPATIONAL MEDICINE (OUTPATIENT)
Dept: URGENT CARE | Facility: PHYSICIAN GROUP | Age: 45
End: 2025-05-24
Payer: COMMERCIAL

## 2025-05-24 VITALS
WEIGHT: 126.3 LBS | SYSTOLIC BLOOD PRESSURE: 96 MMHG | OXYGEN SATURATION: 100 % | RESPIRATION RATE: 18 BRPM | DIASTOLIC BLOOD PRESSURE: 58 MMHG | BODY MASS INDEX: 19.14 KG/M2 | HEART RATE: 69 BPM | HEIGHT: 68 IN | TEMPERATURE: 97 F

## 2025-05-24 DIAGNOSIS — Z02.1 PRE-EMPLOYMENT DRUG SCREENING: ICD-10-CM

## 2025-05-24 DIAGNOSIS — Z02.83 ENCOUNTER FOR DRUG SCREENING: Primary | ICD-10-CM

## 2025-05-24 DIAGNOSIS — S29.019A THORACIC MYOFASCIAL STRAIN, INITIAL ENCOUNTER: Primary | ICD-10-CM

## 2025-05-24 LAB
AMP AMPHETAMINE: NORMAL
BAR BARBITURATES: NORMAL
BREATH ALCOHOL COMMENT: NORMAL
BZO BENZODIAZEPINES: NORMAL
COC COCAINE: NORMAL
INT CON NEG: NORMAL
INT CON POS: NORMAL
MDMA ECSTASY: NORMAL
MET METHAMPHETAMINES: NORMAL
MTD METHADONE: NORMAL
OPI OPIATES: NORMAL
OXY OXYCODONE: NORMAL
PCP PHENCYCLIDINE: NORMAL
POC BREATHALIZER: 0 PERCENT (ref 0–0.01)
POC URINE DRUG SCREEN OCDRS: NEGATIVE
THC: NORMAL

## 2025-05-24 PROCEDURE — 82075 ASSAY OF BREATH ETHANOL: CPT | Performed by: FAMILY MEDICINE

## 2025-05-24 PROCEDURE — 3078F DIAST BP <80 MM HG: CPT | Performed by: FAMILY MEDICINE

## 2025-05-24 PROCEDURE — 80305 DRUG TEST PRSMV DIR OPT OBS: CPT | Performed by: FAMILY MEDICINE

## 2025-05-24 PROCEDURE — 3074F SYST BP LT 130 MM HG: CPT | Performed by: FAMILY MEDICINE

## 2025-05-24 PROCEDURE — 99213 OFFICE O/P EST LOW 20 MIN: CPT | Performed by: FAMILY MEDICINE

## 2025-05-24 RX ORDER — CYCLOBENZAPRINE HCL 10 MG
10 TABLET ORAL 3 TIMES DAILY PRN
Qty: 20 TABLET | Refills: 0 | Status: SHIPPED | OUTPATIENT
Start: 2025-05-24

## 2025-05-24 ASSESSMENT — FIBROSIS 4 INDEX: FIB4 SCORE: 1.09

## 2025-05-24 NOTE — PROGRESS NOTES
"Subjective:   Vanessa Mora is a 45 y.o. 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 Some initial improvement but worsened again with patient positioning.    Previous Injuries/Diseases/Surgeries Contributing to the Condition: NA    Pain is moderate to severe with intermittent radiation to diaphragm area. No radiation to groin. No hematuria. No myelopathy. No fever. No PMH cancer or unwanted weight loss. No second job or outside activity contributing.     PMH:   Reviewed, see above  MEDS:  Medications were reviewed in EMR  ALLERGIES:  Allergies were reviewed in EMR  SOCHX:  Works as a RN   FH:   No pertinent family history to this problem     Objective:   BP 96/58 (BP Location: Right arm, Patient Position: Sitting, BP Cuff Size: Adult)   Pulse 69   Temp 36.1 °C (97 °F)   Resp 18   Ht 1.715 m (5' 7.5\")   Wt 57.3 kg (126 lb 4.8 oz)   SpO2 100%   BMI 19.49 kg/m²     Back: tender and spasm parathoracic musculature. No midline bony tenderness or stepoff. ROM intact.  Neuro: no focal deficit    Assessment/Plan:     1. Thoracic myofascial strain, initial encounter  - cyclobenzaprine (FLEXERIL) 10 MG Tab; Take 1 Tablet by mouth 3 times a day as needed for Muscle Spasms.  Dispense: 20 Tablet; Refill: 0      Restricted Duty FROM 5/24/2025 TO 5/28/2025, follow up scheduled on 5/28/2025 Restriction comments:    Treatment plan comments: Relative rest, ice, otc nsaid, cyclopbenzaprine as needed    Prescription comments: Cyclobenzaprine as needed      Differential diagnosis, natural history, supportive care, and indications for immediate follow-up discussed.    Fernie Farnsworth M.D.    "

## 2025-05-24 NOTE — LETTER
PHYSICIAN’S AND CHIROPRACTIC PHYSICIAN'S   PROGRESS REPORT   CERTIFICATION OF DISABILITY Claim Number:     Social Security Number:    Patient’s Name: Vanessa Mora Date of Injury:    Employer: [employer] Name of [employer] (if applicable):      Patient’s Job Description/Occupation:        Previous Injuries/Diseases/Surgeries Contributing to the Condition:  NA      Diagnosis: (S29.019A) Thoracic myofascial strain, initial encounter  (primary encounter diagnosis)      Related to the Industrial Injury? 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       Objective Medical Findings: Back: tender and spasm parathoracic musculature. No midline bony tenderness or stepoff. ROM intact.  Neuro: no focal deficit         None - Discharged                         Stable  No                 Ratable  No        Generally Improved                         Condition Worsened               X   Condition Same  May Have Suffered a Permanent Disability No     Treatment Plan:    Relative rest, ice, otc nsaid, cyclopbenzaprine as needed           No Change in Therapy                  PT/OT Prescribed                   X   Medication May be Used While Working        Case Management                          PT/OT Discontinued    Consultation    Further Diagnostic Studies:    Prescription(s)           Cyclobenzaprine as needed       Released to FULL DUTY /No Restrictions on (Date):       Certified TOTALLY TEMPORARILY DISABLED (Indicate Dates) From:   To:    X  Released to RESTRICTED/Modified Duty on (Date): From: 5/24/2025 To: 5/28/2025  Restrictions Are:         No Sitting    No Standing    No Pulling Other:         No Bending at Waist X    No Stooping     No Lifting        No Carrying     No Walking Lifting Restricted to (lbs.):  < or = to 10 pounds       No Pushing        No Climbing     No Reaching Above Shoulders       Date of Next Visit:  5/28/2025 Date of  this Exam: 5/24/2025 Physician/Chiropractic Physician Name: Fernie Farnsworth M.D. Physician/Chiropractic Physician Signature:  Prasanth Bradley DO MPH     [place]:  [facility]  [facility], [address] [place], [place] [address] - Telephone [phone] [place]:  [address] [place] [address], [address] [place], [place] [address] - Telephone [phone]    [email or web address]  D-39 (Rev. 10/24)

## 2025-05-24 NOTE — LETTER
PHYSICIAN’S AND CHIROPRACTIC PHYSICIAN'S   PROGRESS REPORT   CERTIFICATION OF DISABILITY Claim Number:     Social Security Number:    Patient’s Name: Vanessa Mora Date of Injury: 4/19/2025   Employer: [employer] Name of [facility] (if applicable):      Patient’s Job Description/Occupation: RN       Previous Injuries/Diseases/Surgeries Contributing to the Condition:  NA      Diagnosis: (S29.019A) Thoracic myofascial strain, initial encounter  (primary encounter diagnosis)      Related to the Industrial Injury? 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       Objective Medical Findings: Back: tender and spasm parathoracic musculature. No midline bony tenderness or stepoff. ROM intact.  Neuro: no focal deficit         None - Discharged                         Stable  No                 Ratable  No        Generally Improved                         Condition Worsened               X   Condition Same  May Have Suffered a Permanent Disability No     Treatment Plan:    Relative rest, ice, otc nsaid, cyclopbenzaprine as needed           No Change in Therapy                  PT/OT Prescribed                   X   Medication May be Used While Working        Case Management                          PT/OT Discontinued    Consultation    Further Diagnostic Studies:    Prescription(s)           Cyclobenzaprine as needed       Released to FULL DUTY /No Restrictions on (Date):       Certified TOTALLY TEMPORARILY DISABLED (Indicate Dates) From:   To:    X  Released to RESTRICTED/Modified Duty on (Date): From: 5/24/2025 To: 5/28/2025  Restrictions Are:         No Sitting    No Standing    No Pulling Other:         No Bending at Waist X    No Stooping     No Lifting        No Carrying     No Walking Lifting Restricted to (lbs.):  < or = to 10 pounds       No Pushing        No Climbing     No Reaching Above Shoulders       Date of Next  Visit:  5/28/2025  9 AM  Date of this Exam: 5/24/2025 Physician/Chiropractic Physician Name: Fernie Farnsworth M.D. Physician/Chiropractic Physician Signature:  Prasanth Bradley DO MPH     [place]:  [address] [place] [address], [address] [place], [place] [address] - Telephone [phone] [place]:  [address] [place] [address], [address] [place], [place] [address] - Telephone [phone]    [email or web address]  D-39 (Rev. 10/24)

## 2025-05-24 NOTE — LETTER
"  EMPLOYEE’S CLAIM FOR COMPENSATION/ REPORT OF INITIAL TREATMENT  FORM C-4  PLEASE TYPE OR PRINT    EMPLOYEE’S CLAIM - PROVIDE ALL INFORMATION REQUESTED   First Name                    ABRIL Mendez                  Last Name  Morgan Birthdate                    1980                Sex  [x]Female Claim Number (Insurer’s Use Only)     Mailing Address  [address]  Age  45 y.o. Height  1.715 m (5' 7.5\") Weight  57.3 kg (126 lb 4.8 oz) Social Security Number     [facility]  [id number] Telephone  There are no phone numbers on file.   Email  [email or web address]    Primary Language Spoken  English    INSURER   THIRD-PARTY   Esis   Employee's Occupation (Job Title) When Injury or Occupational Disease Occurred  RN    Employer's Name/Company Name  [employer]  Telephone  [phone]    Office Mail Address (Number and Street)  [address] [place] [address]     Date of Injury (if applicable) 4/19/2025               Hours Injury (if applicable)  1:00 PM am    pm Date Employer Notified  4/19/2025 Last Day of Work after Injury or Occupational Disease  5/20/2025 Supervisor to Whom Injury Reported  ALYSSA   Address or Location of Accident (if applicable)  Work [1]   What were you doing at the time of accident? (if applicable)  BOOSTING A PT. IN BED    How did this injury or occupational disease occur? (Be specific and answer in detail. Use additional sheet if necessary)  BOOSTING A PT. IN BED W/SLIDE PAD, W/WOUND RN 04/19.  SIG.WORSE 05/20/2025 AFTER 2 DAYS LIFTING PT. W/O2 TERMS.   If you believe that you have an occupational disease, when did you first have knowledge of the disability and its relationship to your employment?   Witnesses to the Accident (if applicable)  LIANNE WOODS RN      Nature of Injury or Occupational Disease  Workers' Compensation  Part(s) of Body Injured or Affected  Lower Back " Area (Lumbar Area & Lumbo-Sacral) Upper Back Area (Thoracic Area) N/A    I CERTIFY THAT THE ABOVE IS TRUE AND CORRECT TO T HE BEST OF MY KNOWLEDGE AND THAT I HAVE PROVIDED THIS INFORMATION IN ORDER TO OBTAIN THE BENEFITS OF [place], OR CHAPTER 617 OF [place]RS).  I HEREBY AUTHORIZE ANY PHYSICIAN, CHIROPRACTOR, SURGEON, PRACTITIONER OR ANY OTHER PERSON, ANY HOSPITAL, INCLUDING [facility] OR [facility], ANY  MEDICAL SERVICE ORGANIZATION, ANY INSURANCE COMPANY, OR OTHER INSTITUTION OR ORGANIZATION TO RELEASE TO EACH OTHER, ANY MEDICAL OR OTHER INFORMATION, INCLUDING BENEFITS PAID OR PAYABLE, PERTINENT TO THIS INJURY OR DISEASE, EXCEPT INFORMATION RELATIVE TO DIAGNOSIS, TREATMENT AND/OR COUNSELING FOR AIDS, PSYCHOLOGICAL CONDITIONS, ALCOHOL OR CONTROLLED SUBSTANCES, FOR WHICH I MUST GIVE SPECIFIC AUTHORIZATION.  A PHOTOSTAT OF THIS AUTHORIZATION SHALL BE VALID AS THE ORIGINAL.     Date 05/24/2025   Place  [facility]  Employee’s Original or  *Electronic Signature   THIS REPORT MUST BE COMPLETED AND MAILED WITHIN 3 WORKING DAYS OF TREATMENT   Place  [facility]    Name of Facility  [place]   Date 5/24/2025 Diagnosis and Description of Injury or Occupational Disease  (S29.019A) Thoracic myofascial strain, initial encounter  (primary encounter diagnosis)  The encounter diagnosis was Thoracic myofascial strain, initial encounter. Is there evidence that the injured employee was under the influence of alcohol and/or another controlled substance at the time of accident?  []No  [] Yes (if yes, please explain)   Hour 1:43 PM  No   Treatment: Relative rest, ice, otc nsaid, cyclobenzaprine as needed    Have you advised the patient to remain off work five days or more?   No  [] Yes  If yes, indicate dates: From_ _                                                      To __ _  [] No   If no, is the injured employee capable of:  [] full duty No   [] modified duty Yes    If modified duty, specify any limitations / restrictions:__________________  ___No stooping  Lifting limited to 10 #___________________________     X-Ray Findings:   Comments:NA    From information given by the employee, together with medical evidence, can you directly connect this injury or occupational disease as job incurred?  []Yes   [] No Yes    Is additional medical care by a physician indicated? []Yes [] No  Yes    Do you know of any previous injury or disease contributing to this condition or occupational disease? []Yes [] No (Explain if yes)                          No   Date  5/24/2025 Print Health Care Provider’s Name  Fernie Farnsworth M.D. I certify that the employer’s copy of  this form was delivered to the employer on:   Address  [address] [place] [address]. INSURER'S USE ONLY                       [facility] Zip  [address] Provider’s Tax ID Number  [id number]   Telephone  Dept: [phone]    Health Care Provider’s Original or Electronic Signature      e-FERNIE Ngo M.D.    Degree (MD,DO, DC,PA-C,APRN)    M.[employer].      ORIGINAL - TREATING HEALTHCARE PROVIDER PAGE 2 - INSURER/TPA PAGE 3 - EMPLOYER PAGE 4 - EMPLOYEE             Form C-4 (rev.02/25)

## 2025-05-24 NOTE — LETTER
"  EMPLOYEE’S CLAIM FOR COMPENSATION/ REPORT OF INITIAL TREATMENT  FORM C-4  PLEASE TYPE OR PRINT    EMPLOYEE’S CLAIM - PROVIDE ALL INFORMATION REQUESTED   First Name                    ABRIL Mendez                  Last Name  Morgan Birthdate                    1980                Sex  [x]Female Claim Number (Insurer’s Use Only)     Mailing Address  [address]  Age  45 y.o. Height  1.715 m (5' 7.5\") Weight  57.3 kg (126 lb 4.8 oz) Social Security Number     [facility] Zip  [address] Telephone  There are no phone numbers on file.   Email  [email or web address]    Primary Language Spoken  English    INSURER  *** THIRD-PARTY   Esis   Employee's Occupation (Job Title) When Injury or Occupational Disease Occurred      Employer's Name/Company Name  [employer]  Telephone  [phone]    Office Mail Address (Number and Street)  [facility] [facility] [address]     Date of Injury (if applicable)                Hours Injury (if applicable)   am    pm Date Employer Notified   Last Day of Work after Injury or Occupational Disease   Supervisor to Whom Injury Reported     Address or Location of Accident (if applicable)     What were you doing at the time of accident? (if applicable)      How did this injury or occupational disease occur? (Be specific and answer in detail. Use additional sheet if necessary)     If you believe that you have an occupational disease, when did you first have knowledge of the disability and its relationship to your employment?   Witnesses to the Accident (if applicable)        Nature of Injury or Occupational Disease    Part(s) of Body Injured or Affected        I CERTIFY THAT THE ABOVE IS TRUE AND CORRECT TO T HE BEST OF MY KNOWLEDGE AND THAT I HAVE PROVIDED THIS INFORMATION IN ORDER TO OBTAIN THE BENEFITS OF [place] AND [place], " INCLUSIVE, OR CHAPTER 617 OF [place]RS).  I HEREBY AUTHORIZE ANY PHYSICIAN, CHIROPRACTOR, SURGEON, PRACTITIONER OR ANY OTHER PERSON, ANY HOSPITAL, INCLUDING [facility] OR [facility], ANY  MEDICAL SERVICE ORGANIZATION, ANY INSURANCE COMPANY, OR OTHER INSTITUTION OR ORGANIZATION TO RELEASE TO EACH OTHER, ANY MEDICAL OR OTHER INFORMATION, INCLUDING BENEFITS PAID OR PAYABLE, PERTINENT TO THIS INJURY OR DISEASE, EXCEPT INFORMATION RELATIVE TO DIAGNOSIS, TREATMENT AND/OR COUNSELING FOR AIDS, PSYCHOLOGICAL CONDITIONS, ALCOHOL OR CONTROLLED SUBSTANCES, FOR WHICH I MUST GIVE SPECIFIC AUTHORIZATION.  A PHOTOSTAT OF THIS AUTHORIZATION SHALL BE VALID AS THE ORIGINAL.     Date   Place Employee’s Original or  *Electronic Signature   THIS REPORT MUST BE COMPLETED AND MAILED WITHIN 3 WORKING DAYS OF TREATMENT   Place  [facility] [place] [facility]    Name of Facility  [place]   Date 5/24/2025 Diagnosis and Description of Injury or Occupational Disease  (S29.019A) Thoracic myofascial strain, initial encounter  (primary encounter diagnosis)  The encounter diagnosis was Thoracic myofascial strain, initial encounter. Is there evidence that the injured employee was under the influence of alcohol and/or another controlled substance at the time of accident?  []No  [] Yes (if yes, please explain)   Hour 1:43 PM  No   Treatment: Relative rest, ice, otc nsaid, cyclobenzaprine as needed    Have you advised the patient to remain off work five days or more?   No  [] Yes  If yes, indicate dates: From_ _                                                      To __ _  [] No   If no, is the injured employee capable of: [] full duty No   [] modified duty Yes    If modified duty, specify any limitations / restrictions:__________________  ___No stooping  Lifting limited to 10 #___________________________     X-Ray Findings:   Comments:NA    From information given by the employee, together with medical evidence, can you directly connect this  injury or occupational disease as job incurred?  []Yes   [] No Yes    Is additional medical care by a physician indicated? []Yes [] No  Yes    Do you know of any previous injury or disease contributing to this condition or occupational disease? []Yes [] No (Explain if yes)                          No   Date  5/24/2025 Print Health Care Provider’s Name  Fernie Farnsworht M.D. I certify that the employer’s copy of  this form was delivered to the employer on:   Address  [address] [place] [address]. INSURER'S USE ONLY                       [facility]  [address] Provider’s Tax ID Number  [id number]   Telephone  Dept: [phone]    Health Care Provider’s Original or Electronic Signature      e-FERNIE Ngo M.D.    Degree (MD,DO, DC,PA-C,APRN)  {Provider Degrees:23774}  Choose (if applicable)      ORIGINAL - TREATING HEALTHCARE PROVIDER PAGE 2 - INSURER/TPA PAGE 3 - EMPLOYER PAGE 4 - EMPLOYEE             Form C-4 (rev.02/25)

## 2025-05-28 ENCOUNTER — OCCUPATIONAL MEDICINE (OUTPATIENT)
Dept: URGENT CARE | Facility: PHYSICIAN GROUP | Age: 45
End: 2025-05-28
Payer: COMMERCIAL

## 2025-05-28 VITALS
HEIGHT: 68 IN | DIASTOLIC BLOOD PRESSURE: 64 MMHG | OXYGEN SATURATION: 100 % | TEMPERATURE: 97.6 F | WEIGHT: 126 LBS | RESPIRATION RATE: 16 BRPM | BODY MASS INDEX: 19.1 KG/M2 | SYSTOLIC BLOOD PRESSURE: 102 MMHG | HEART RATE: 75 BPM

## 2025-05-28 DIAGNOSIS — S29.019D THORACIC MYOFASCIAL STRAIN, SUBSEQUENT ENCOUNTER: Primary | ICD-10-CM

## 2025-05-28 ASSESSMENT — FIBROSIS 4 INDEX: FIB4 SCORE: 1.09

## 2025-05-28 ASSESSMENT — VISUAL ACUITY: OU: 1

## 2025-05-28 NOTE — LETTER
PHYSICIAN’S AND CHIROPRACTIC PHYSICIAN'S   PROGRESS REPORT   CERTIFICATION OF DISABILITY Claim Number:     Social Security Number:    Patient’s Name: Vanessa Mora Date of Injury:    Employer: [employer] Name of [employer] (if applicable):      Patient’s Job Description/Occupation:        Previous Injuries/Diseases/Surgeries Contributing to the Condition:         Diagnosis: (S29.019D) Thoracic myofascial strain, subsequent encounter  (primary encounter diagnosis)      Related to the Industrial Injury?   Yes          Objective Medical Findings:  Back: tender and spasm parathoracic musculature. No midline bony tenderness or stepoff. ROM intact.  Neuro: no focal deficit          None - Discharged                       X  Stable                    Ratable           Generally Improved                         Condition Worsened                 X Condition Same  May Have Suffered a Permanent Disability  No      Treatment Plan:     Medrol dosepak  Cyclobenzaprine PRN nightly  Maintain work restrictions  FU with Occupational Medicine          No Change in Therapy                  PT/OT Prescribed                     X Medication May be Used While Working        Case Management                          PT/OT Discontinued    Consultation    Further Diagnostic Studies:    Prescription(s)            Medrol dosepak      Released to FULL DUTY /No Restrictions on (Date):       Certified TOTALLY TEMPORARILY DISABLED (Indicate Dates) From:   To:      Released to RESTRICTED/Modified Duty on (Date): From:  5/28/25 To:  6/4/25.   Restrictions Are:   Temporary       No Sitting    No Standing    No Pulling Other:         No Bending at Waist   X  No Stooping     No Lifting        No Carrying     No Walking Lifting Restricted to (lbs.):   <5 pounds        No Pushing        No Climbing     No Reaching Above Shoulders       Date of Next Visit:   6/3/25 (with Occupational Medicine)  Date of this Exam: 5/28/2025 Physician/Chiropractic  Physician Name: EDWARDO Titus Physician/Chiropractic Physician Signature:  Prasanth Bradley DO MPH     [place]:  [facility], [address] [place], [place] [address] - Telephone [phone] [place]:  [address]  [facility], [address] [place], [place] [address] - Telephone [phone]    [email or web address]  D-39 (Rev. 10/24)

## 2025-05-28 NOTE — PROGRESS NOTES
"Subjective:     Vanessa Mora is a 45 y.o. 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 Some initial improvement but worsened again with patient positioning. Pain is moderate to severe with intermittent radiation to diaphragm area. No radiation to groin. No hematuria. No myelopathy. No fever. No PMH cancer or unwanted weight loss. No second job or outside activity contributing.\"     5/28/25 - Patient reports no improvement in back pain. Reports pain is aggravated by movement and exertion. Pain is located overlying generalized thoracic region and does not radiate. Denies fever, vomiting, or shortness of breath. No incontinence of bladder or bowel. She has attempted cyclobenzaprine nightly for relief.      PMH:   No pertinent past medical history to this problem  MEDS:  Medications were reviewed in EMR  ALLERGIES:  Allergies were reviewed in EMR   FH:   No pertinent family history to this problem     Objective:     /64 (BP Location: Right arm, Patient Position: Sitting, BP Cuff Size: Adult)   Pulse 75   Temp 36.4 °C (97.6 °F)   Resp 16   Ht 1.715 m (5' 7.5\")   Wt 57.2 kg (126 lb)   SpO2 100%   BMI 19.44 kg/m²     Physical Exam  Vitals reviewed.   Constitutional:       General: She is not in acute distress.  HENT:      Nose: Nose normal.   Eyes:      General: Vision grossly intact. Gaze aligned appropriately. No visual field deficit.     Extraocular Movements: Extraocular movements intact.      Conjunctiva/sclera: Conjunctivae normal.      Pupils: Pupils are equal, round, and reactive to light.   Cardiovascular:      Rate and Rhythm: Normal rate and regular rhythm.      Pulses: Normal pulses.      Heart sounds: Normal heart sounds.   Pulmonary:      Effort: Pulmonary effort is normal. No tachypnea, accessory muscle usage, prolonged expiration, " respiratory distress or retractions.      Breath sounds: Normal breath sounds. No stridor, decreased air movement or transmitted upper airway sounds. No wheezing, rhonchi or rales.   Musculoskeletal:         General: Tenderness present. No swelling or deformity. Normal range of motion.      Cervical back: Full passive range of motion without pain, normal range of motion and neck supple. No swelling, deformity, erythema, rigidity, spasms, tenderness, bony tenderness or crepitus. No pain with movement, spinous process tenderness or muscular tenderness. Normal range of motion.      Thoracic back: Spasms and tenderness present. No swelling, deformity or bony tenderness. Normal range of motion.      Lumbar back: No swelling, deformity, spasms, tenderness or bony tenderness. Normal range of motion.      Comments: Back: tender and spasm parathoracic musculature. No midline bony tenderness or stepoff. ROM intact.  Neuro: no focal deficit     Skin:     General: Skin is warm and dry.      Capillary Refill: Capillary refill takes less than 2 seconds.      Findings: No ecchymosis, erythema or rash.   Neurological:      Mental Status: She is alert. Mental status is at baseline.      Sensory: Sensation is intact.      Motor: Motor function is intact.      Coordination: Coordination is intact.      Gait: Gait is intact.   Psychiatric:         Mood and Affect: Mood normal.         Behavior: Behavior normal.         Thought Content: Thought content normal.       Assessment/Plan:     1. Thoracic myofascial strain, subsequent encounter (Primary)   - methylPREDNISolone (MEDROL DOSEPAK) 4 MG Tablet Therapy Pack; Follow schedule on package instructions.  Dispense: 21 Tablet; Refill: 0     Released to RESTRICTED Duty FROM 5/28/25 TO 6/4/25.   Restrictions: No stooping   Will treat with Medrol dosepak   Discussed with patient signs and symptoms consistent with a back strain. Patient advised on how to correctly take the Medrol dosepak    Treatment of as above and initial rest with no heavy lifting, stooping, or strenuous activity. Massage, ice and/or heat which ever feels better, and Tylenol per manufacture's instructions. Encouraged walking, stretching, and range of motion exercises as tolerated. Avoid sitting or laying down for long periods of time except for at night during sleep.   Treatment of cyclobenzaprine - Discussed with patient this medication can cause drowsiness/sedation. The patient was instructed to use medication mostly during the evening/night time. Instructed to avoid driving, operating machinery, or drinking alcohol while using this medication.   Patient is overall very well-appearing, no acute distress, and normal vital signs. Suspicions for acute emergent pathology are low.   Follow up with your PCP or return to urgent care if symptoms not improving in 1-2 weeks.      Follow up with Occupational Medicine     Illness progression and alarm symptoms discussed with patient, emphasizing low threshold for returning to clinic/emergency department for worsening symptoms. Patient is agreeable to the plan and verbalizes understanding, and will follow up if warranted.     Differential diagnosis, natural history, supportive care, and indications for immediate follow-up discussed.     Follow-up as needed if symptoms worsen or fail to improve to PCP, Urgent care or Emergency Room.                               Electronically signed by OSIEL Perez

## 2025-05-29 RX ORDER — METHYLPREDNISOLONE 4 MG/1
TABLET ORAL
Qty: 21 TABLET | Refills: 0 | Status: SHIPPED | OUTPATIENT
Start: 2025-05-29

## 2025-06-03 ENCOUNTER — OCCUPATIONAL MEDICINE (OUTPATIENT)
Dept: OCCUPATIONAL MEDICINE | Facility: CLINIC | Age: 45
End: 2025-06-03
Payer: COMMERCIAL

## 2025-06-03 DIAGNOSIS — S39.012D STRAIN OF LUMBAR REGION, SUBSEQUENT ENCOUNTER: Primary | ICD-10-CM

## 2025-06-03 PROCEDURE — 99213 OFFICE O/P EST LOW 20 MIN: CPT | Performed by: PREVENTIVE MEDICINE

## 2025-06-03 NOTE — LETTER
PHYSICIAN’S AND CHIROPRACTIC PHYSICIAN'S   PROGRESS REPORT   CERTIFICATION OF DISABILITY Claim Number:     Social Security Number:    Patient’s Name: Vanessa Mora Date of Injury: 4/19/2025   Employer: [employer] Name of [facility] (if applicable):      Patient’s Job Description/Occupation: RN       Previous Injuries/Diseases/Surgeries Contributing to the Condition:         Diagnosis: (S39.012D) Strain of lumbar region, subsequent encounter  (primary encounter diagnosis)      Related to the Industrial Injury? Yes     Explain:        Objective Medical Findings: Lumbar: No gross deformity or full range of motion with pain or difficulty.  Normal gait.      X   None - Discharged                         Stable  Yes                 Ratable  No     X   Generally Improved                         Condition Worsened                  Condition Same  May Have Suffered a Permanent Disability No     Treatment Plan:    Symptoms mostly improved  Released from care  Full duty  Follow-up as needed         No Change in Therapy                  PT/OT Prescribed                      Medication May be Used While Working        Case Management                          PT/OT Discontinued    Consultation    Further Diagnostic Studies:    Prescription(s)               X  Released to FULL DUTY /No Restrictions on (Date):  6/3/2025    Certified TOTALLY TEMPORARILY DISABLED (Indicate Dates) From:   To:      Released to RESTRICTED/Modified Duty on (Date): From:   To:    Restrictions Are:         No Sitting    No Standing    No Pulling Other:         No Bending at Waist     No Stooping     No Lifting        No Carrying     No Walking Lifting Restricted to (lbs.):          No Pushing        No Climbing     No Reaching Above Shoulders       Date of Next Visit:   Release from care Date of this Exam: 6/3/2025 Physician/Chiropractic Physician Name: Prasanth Bradley D.O. Physician/Chiropractic Physician Signature:  Prasanth Bradley DO [facility]:   [id number], [address] [place], [place] [address] - Telephone [phone] [place]:  [address], [address] [place], [place] [address] - Telephone [phone]    [email or web address]  D-39 (Rev. 10/24)

## 2025-06-03 NOTE — PROGRESS NOTES
Subjective:   Vanessa Mora is a 45 y.o. female who presents for Follow-Up      Date of Injury: 4/19/2025   Industrial Injury: Yes , Comments: BOOSTING A PT. IN BED W/SLIDE PAD, W/WOUND RN 04/19.  SIG.WORSE 05/20/2025 AFTER 2 DAYS LIFTING PT. W/O2 TERMS.  Previous Injuries/Diseases/Surgeries Contributing to the Condition:      6/3/2025: Patient states overall symptoms are much improved.  Has some minimal discomfort at this point.  Feels ready for release of full duty.  Denies any radiating pain, numbness or tingling.    PMH:   Reviewed, see above  MEDS:  Medications were reviewed in EMR  ALLERGIES:  Allergies were reviewed in EMR  SOCHX:  Works as a RN at [employer]   :   No pertinent family history to this problem     Objective:   There were no vitals taken for this visit.    Constitutional: Patient is in no acute distress. Appears well-developed and well-nourished.   Cardiovascular: Normal rate.    Pulmonary/Chest: Effort normal. No respiratory distress.   Neurological: Patient is alert and oriented to person, place, and time.   Skin: Skin is warm and dry.   Psychiatric: Normal mood and affect. Behavior is normal.     Lumbar: No gross deformity or full range of motion with pain or difficulty.  Normal gait.    Assessment/Plan:     1. Strain of lumbar region, subsequent encounter      Released to Full Duty - Maximum Medical Improvement (MMI), not ratable, no permanent work restrictions  Symptoms mostly improved  Released from care  Full duty  Follow-up as needed    Differential diagnosis, natural history, supportive care, and indications for immediate follow-up discussed.        Prasanth Bradley D.O.

## 2025-07-12 ENCOUNTER — NON-PROVIDER VISIT (OUTPATIENT)
Dept: URGENT CARE | Facility: PHYSICIAN GROUP | Age: 45
End: 2025-07-12
Payer: COMMERCIAL

## 2025-07-12 VITALS
OXYGEN SATURATION: 98 % | RESPIRATION RATE: 14 BRPM | HEART RATE: 61 BPM | SYSTOLIC BLOOD PRESSURE: 104 MMHG | HEIGHT: 68 IN | TEMPERATURE: 97.7 F | BODY MASS INDEX: 19.1 KG/M2 | WEIGHT: 126 LBS | DIASTOLIC BLOOD PRESSURE: 64 MMHG

## 2025-07-12 DIAGNOSIS — Z11.1 PPD SCREENING TEST: Primary | ICD-10-CM

## 2025-07-12 PROCEDURE — 99212 OFFICE O/P EST SF 10 MIN: CPT | Performed by: NURSE PRACTITIONER

## 2025-07-12 ASSESSMENT — FIBROSIS 4 INDEX: FIB4 SCORE: 1.09

## 2025-07-12 NOTE — PROGRESS NOTES
"Patient/parent/guardian has consented to treatment and for use of patient information for treatment and billing purposes.  Subjective:   Vanessa Mora  is a 45 y.o. female who presents for Requesting Labs (Quantiferon )       45-year-old female presents with request for lab work.  She has a new job at [employer], and needs to have a TB test.  Unfortunately she will not be in a vicinity at the quite time for reading result.  Requesting TB quantiferon.         ROS      CURRENT MEDICATIONS:  cyclobenzaprine  cyclobenzaprine Tabs  Fluzone Quadrivalent Kiley  methylPREDNISolone Tbpk  Allergies:   Allergies[1]  Current Problems: Vanessa Mora does not have any pertinent problems on file.  Past Surgical Hx:  No past surgical history on file.   Past Social Hx:  reports that she has never smoked. She has never been exposed to tobacco smoke. She has never used smokeless tobacco. She reports current alcohol use of about 1.2 oz of alcohol per week. She reports that she does not use drugs.    Objective:   /64   Pulse 61   Temp 36.5 °C (97.7 °F) (Temporal)   Resp 14   Ht 1.715 m (5' 7.5\")   Wt 57.2 kg (126 lb)   SpO2 98%   BMI 19.44 kg/m²   Physical Exam  Vitals and nursing note reviewed.   Constitutional:       General: She is not in acute distress.     Appearance: Normal appearance. She is not ill-appearing.   HENT:      Head: Normocephalic and atraumatic.      Right Ear: External ear normal.      Left Ear: External ear normal.      Nose: Nose normal.   Eyes:      Extraocular Movements: Extraocular movements intact.      Conjunctiva/sclera: Conjunctivae normal.      Pupils: Pupils are equal, round, and reactive to light.   Cardiovascular:      Rate and Rhythm: Normal rate.   Pulmonary:      Effort: Pulmonary effort is normal.   Musculoskeletal:         General: Normal range of motion.      Cervical back: Normal range of motion.   Skin:     General: Skin is warm and dry.   Neurological:      General: No " focal deficit present.      Mental Status: She is alert.         No results found.  Assessment/Plan:   1. PPD screening test  - Quantiferon Gold TB (PPD)    Lab ordered per patient request.   Clear instructions provided.  Verified patient/parent/guardian understood by having them repeated back to me. Discussed expected length of time for symptom improvement as well as when to return to clinic for reexam.  Reviewed red flags and ER precautions.  Discussed medication management options, risks and benefits, and alternatives to treatment plan agreed upon. Instructed to continue medications without changes as ordered by primary care unless aforementioned above.  Patient expresses understanding and agrees to plan of care. All questions or concerns answered. For my MDM, I have personally reviewed previous notes, and test results as pertinent to today's visit.    Please note that this dictation was created using voice recognition software. I have made every reasonable attempt to correct obvious errors,  but there may be grammar errors, and possibly content that I did not discover before finalizing the note.   This note was electronically signed by EDWARDO Cam                [1] No Known Allergies

## 2025-07-14 ENCOUNTER — HOSPITAL ENCOUNTER (OUTPATIENT)
Dept: LAB | Facility: MEDICAL CENTER | Age: 45
End: 2025-07-14
Attending: NURSE PRACTITIONER
Payer: COMMERCIAL

## 2025-07-14 PROCEDURE — 86480 TB TEST CELL IMMUN MEASURE: CPT

## 2025-07-14 PROCEDURE — 36415 COLL VENOUS BLD VENIPUNCTURE: CPT

## 2025-07-15 LAB
GAMMA INTERFERON BACKGROUND BLD IA-ACNC: 0.03 IU/ML
M TB IFN-G BLD-IMP: NEGATIVE
M TB IFN-G CD4+ BCKGRND COR BLD-ACNC: 0 IU/ML
MITOGEN IGNF BCKGRD COR BLD-ACNC: 7.23 IU/ML
QFT TB2 - NIL TBQ2: -0.01 IU/ML

## 2025-07-17 ENCOUNTER — NON-PROVIDER VISIT (OUTPATIENT)
Dept: URGENT CARE | Facility: PHYSICIAN GROUP | Age: 45
End: 2025-07-17
Payer: COMMERCIAL

## 2025-07-17 DIAGNOSIS — Z11.1 PPD SCREENING TEST: Primary | ICD-10-CM

## 2025-07-17 PROCEDURE — 86580 TB INTRADERMAL TEST: CPT

## 2025-07-18 NOTE — PROGRESS NOTES
Vanessa Mora is a 45 y.o. female here for a non-provider visit for PPD placement -- Step 1 of 1    Reason for PPD:  work requirement    1. TB evaluation questionnaire completed by patient? Yes      -  If any answers marked yes did you contact a provider prior to placing? No  2.  Patient notified to return to clinic for reading on: 7/20/2025  3.  PPD Placement documentation completed on TB evaluation questionnaire? Yes  4.  Location of TB evaluation questionnaire filed: [facility]

## 2025-07-20 ENCOUNTER — NON-PROVIDER VISIT (OUTPATIENT)
Dept: URGENT CARE | Facility: PHYSICIAN GROUP | Age: 45
End: 2025-07-20

## 2025-07-20 DIAGNOSIS — Z11.1 PPD SCREENING TEST: Primary | ICD-10-CM

## 2025-07-20 LAB — TB WHEAL 3D P 5 TU DIAM: 0 MM

## 2025-07-27 NOTE — PROGRESS NOTES
Vanessa Mora is a 45 y.o. female here for a non-provider visit for PPD reading -- Step 1 of 1.      1.  Resulted in Epic under enter/edit results? Yes   2.  TB evaluation questionnaire scanned into chart and original given to patient?Yes      3. Was induration greater than 0 mm? No.    If Step 1 of 2, when is patient returning for second step (delete if N/A): N/A    Routed to PCP? No   This encounter was created in error - please disregard.

## 2025-07-27 NOTE — PROGRESS NOTES
Vanessa Mora is a 45 y.o. female here for a non-provider visit for PPD reading -- Step 1 of 1.      1.  Resulted in Epic under enter/edit results? Yes   2.  TB evaluation questionnaire scanned into chart and original given to patient?Yes      3. Was induration greater than 0 mm? No.    If Step 1 of 2, when is patient returning for second step (delete if N/A    Routed to PCP? Yes